# Patient Record
Sex: MALE | Race: BLACK OR AFRICAN AMERICAN | NOT HISPANIC OR LATINO | Employment: FULL TIME | URBAN - METROPOLITAN AREA
[De-identification: names, ages, dates, MRNs, and addresses within clinical notes are randomized per-mention and may not be internally consistent; named-entity substitution may affect disease eponyms.]

---

## 2018-04-02 ENCOUNTER — HOSPITAL ENCOUNTER (EMERGENCY)
Facility: MEDICAL CENTER | Age: 33
End: 2018-04-05
Attending: EMERGENCY MEDICINE

## 2018-04-02 DIAGNOSIS — F22 PARANOIA (PSYCHOSIS) (HCC): ICD-10-CM

## 2018-04-02 LAB
AMPHET UR QL SCN: NEGATIVE
BARBITURATES UR QL SCN: NEGATIVE
BENZODIAZ UR QL SCN: NEGATIVE
BZE UR QL SCN: POSITIVE
CANNABINOIDS UR QL SCN: NEGATIVE
METHADONE UR QL SCN: NEGATIVE
OPIATES UR QL SCN: NEGATIVE
OXYCODONE UR QL SCN: NEGATIVE
PCP UR QL SCN: NEGATIVE
POC BREATHALIZER: 0.06 PERCENT (ref 0–0.01)
PROPOXYPH UR QL SCN: NEGATIVE

## 2018-04-02 PROCEDURE — A9270 NON-COVERED ITEM OR SERVICE: HCPCS | Performed by: EMERGENCY MEDICINE

## 2018-04-02 PROCEDURE — 99285 EMERGENCY DEPT VISIT HI MDM: CPT

## 2018-04-02 PROCEDURE — 700102 HCHG RX REV CODE 250 W/ 637 OVERRIDE(OP): Performed by: EMERGENCY MEDICINE

## 2018-04-02 PROCEDURE — 80307 DRUG TEST PRSMV CHEM ANLYZR: CPT

## 2018-04-02 PROCEDURE — A9270 NON-COVERED ITEM OR SERVICE: HCPCS | Performed by: PSYCHIATRY & NEUROLOGY

## 2018-04-02 PROCEDURE — 90791 PSYCH DIAGNOSTIC EVALUATION: CPT

## 2018-04-02 PROCEDURE — 302970 POC BREATHALIZER: Performed by: EMERGENCY MEDICINE

## 2018-04-02 PROCEDURE — 700102 HCHG RX REV CODE 250 W/ 637 OVERRIDE(OP): Performed by: PSYCHIATRY & NEUROLOGY

## 2018-04-02 RX ORDER — RISPERIDONE 1 MG/1
1 TABLET ORAL 2 TIMES DAILY
Status: DISCONTINUED | OUTPATIENT
Start: 2018-04-02 | End: 2018-04-05 | Stop reason: HOSPADM

## 2018-04-02 RX ORDER — CITALOPRAM 20 MG/1
20 TABLET ORAL DAILY
COMMUNITY
End: 2018-04-02

## 2018-04-02 RX ORDER — ALPRAZOLAM 0.5 MG/1
0.5 TABLET ORAL
COMMUNITY
End: 2018-04-02

## 2018-04-02 RX ORDER — NICOTINE 21 MG/24HR
21 PATCH, TRANSDERMAL 24 HOURS TRANSDERMAL
Status: DISCONTINUED | OUTPATIENT
Start: 2018-04-02 | End: 2018-04-05 | Stop reason: HOSPADM

## 2018-04-02 RX ORDER — BUPROPION HYDROCHLORIDE 75 MG/1
75 TABLET ORAL 2 TIMES DAILY
COMMUNITY
End: 2018-04-02

## 2018-04-02 RX ADMIN — RISPERIDONE 1 MG: 1 TABLET, FILM COATED ORAL at 21:47

## 2018-04-02 RX ADMIN — NICOTINE TRANSDERMAL SYSTEM 21 MG: 21 PATCH, EXTENDED RELEASE TRANSDERMAL at 03:30

## 2018-04-02 RX ADMIN — RISPERIDONE 1 MG: 1 TABLET, FILM COATED ORAL at 11:40

## 2018-04-02 ASSESSMENT — PAIN SCALES - GENERAL
PAINLEVEL_OUTOF10: 0
PAINLEVEL_OUTOF10: 0

## 2018-04-02 ASSESSMENT — PAIN SCALES - WONG BAKER: WONGBAKER_NUMERICALRESPONSE: DOESN'T HURT AT ALL

## 2018-04-02 NOTE — PROGRESS NOTES
Patient's home medications have been reviewed by the pharmacy team.     No past medical history on file.    Patient's Medications   New Prescriptions    No medications on file   Previous Medications    No medications on file   Modified Medications    No medications on file   Discontinued Medications    ALPRAZOLAM (XANAX) 0.5 MG TAB    Take 0.5 mg by mouth 1 time daily as needed for Anxiety.    BUPROPION (WELLBUTRIN) 75 MG TAB    Take 75 mg by mouth 2 times a day.    CITALOPRAM (CELEXA) 20 MG TAB    Take 20 mg by mouth every day.          A:  Patient states he does not take any prescription or OTC medications.  Medications do not appear to be contributing to current complaints.       P:    No recommendations at this time. Will defer to psychiatry recommendations for current complaints.    Ariana Fields, PharmD., BCPS  PGY-2 Critical Care Resident  [phone]
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
## 2018-04-02 NOTE — ED TRIAGE NOTES
"Chief Complaint   Patient presents with   • Psych Eval     pt states his PCP prescribed him celexa, wellbutrin, and xanax. pt states he has not had anxiety and visual/auditory hallucinations. pt working in [place]- pt from [place]. pt went to hospital in [place]. They \"told me to sleep it off and gave me xanax\" pt is currently having relationship issues with girlfriend which is his trigger.  denies SI/HI. pt was dxw ith bipolar in 11/17       "

## 2018-04-02 NOTE — ED NOTES
PT IS RESTING IN BED NAD, PT IS CALM, RN WAS GIVEN REPORT. PT IS A SAD OF 6. RN WAS TOLD ABOUT PTS BELONGS.

## 2018-04-02 NOTE — DISCHARGE PLANNING
Medical Social Work    Referral: Legal Hold    Intervention: Legal Hold Paperwork given to SW by [employer]: Alka    Legal Hold Initiated: Date: 04/02/2018  Time: 0153    Legal Hold faxed: Date: 04/02/2018  Time: 0515    Patient’s Insurance Listed on Face Sheet: None    Referrals sent to: [facility]    Plan: Patient will transfer to mental health facility once acceptance is obtained.
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
## 2018-04-03 PROCEDURE — A9270 NON-COVERED ITEM OR SERVICE: HCPCS | Performed by: EMERGENCY MEDICINE

## 2018-04-03 PROCEDURE — 700102 HCHG RX REV CODE 250 W/ 637 OVERRIDE(OP): Performed by: EMERGENCY MEDICINE

## 2018-04-03 PROCEDURE — 700102 HCHG RX REV CODE 250 W/ 637 OVERRIDE(OP): Performed by: PSYCHIATRY & NEUROLOGY

## 2018-04-03 PROCEDURE — A9270 NON-COVERED ITEM OR SERVICE: HCPCS | Performed by: PSYCHIATRY & NEUROLOGY

## 2018-04-03 RX ORDER — ALPRAZOLAM 0.25 MG/1
0.5 TABLET ORAL ONCE
Status: COMPLETED | OUTPATIENT
Start: 2018-04-03 | End: 2018-04-03

## 2018-04-03 RX ADMIN — RISPERIDONE 1 MG: 1 TABLET, FILM COATED ORAL at 09:04

## 2018-04-03 RX ADMIN — RISPERIDONE 1 MG: 1 TABLET, FILM COATED ORAL at 21:08

## 2018-04-03 RX ADMIN — ALPRAZOLAM 0.5 MG: 0.25 TABLET ORAL at 10:46

## 2018-04-03 RX ADMIN — NICOTINE TRANSDERMAL SYSTEM 21 MG: 21 PATCH, EXTENDED RELEASE TRANSDERMAL at 06:29

## 2018-04-03 ASSESSMENT — PAIN SCALES - WONG BAKER: WONGBAKER_NUMERICALRESPONSE: DOESN'T HURT AT ALL

## 2018-04-03 ASSESSMENT — PAIN SCALES - GENERAL: PAINLEVEL_OUTOF10: 0

## 2018-04-03 NOTE — ED NOTES
"Pt stated he is upset he can't listen to the tv, he is \"sick of reading the subtitles, all I can hear are my thoughts and y'alls voices, you can understand why I'm aggravated, I'm not trying to be mean but this has been going on since Sunday\"    Apologized to pt and explained 1 time order of xanax ordered for pt. Pt cooperative.  "

## 2018-04-03 NOTE — ED PROVIDER NOTES
ED Provider Note    Addendum:  Patient is waiting for psychiatric transfer. He has been resting pretty comfortably in the emergency room. We did give him one Xanax while here. He has been seen by psychiatry.

## 2018-04-03 NOTE — ED NOTES
"Pt's girlfriend called for update, updated pt's girlfriend. Pt aware girlfriend called.     Pt slightly irritated he can't smoke, says \" no one told me I couldn't smoke\" explained hospital is non-smoking and pt has nicotine patch. Pt cooperative and agreeable.    Cont 1:1 mx  "

## 2018-04-03 NOTE — ED PROVIDER NOTES
ED Provider Note    4/2 this is a patient awaiting transfer to psychiatric facility. Currently has no complaints.    Age and has been pleasant, cooperative. Vital signs have remained normal.    April 4. This is a patient awaiting transfer to psychiatric facility. Currently pleasant and cooperative, no complaints. Vital signs have remained normal

## 2018-04-03 NOTE — ED NOTES
Pt refused breakfast, only wanted OJ with his meds.    Pt Aox4. Denies VH/AH since he's been in the Er. States he feels safe. Pt aware he is waiting to be transferred to psych facility.     Pt went back to sleep. Cont 1:1 mx at bedside

## 2018-04-03 NOTE — DISCHARGE PLANNING
Extension petition filed with the Court via eFlex, waiting on verified petition. Pt awaiting transfer to psychiatric facility.

## 2018-04-03 NOTE — ED NOTES
Pt ambulated to  with steady gait.    Sitter accompanied pt.    Pt req toothbrush and toothpaste, items provided for oral care and removed after use

## 2018-04-03 NOTE — DISCHARGE PLANNING
Alert Team Rounds: Patient observed resting, continues to await psychiatric hospitalization.    Alka La, Ph.D.  Alert Team Therapist

## 2018-04-04 PROCEDURE — 700102 HCHG RX REV CODE 250 W/ 637 OVERRIDE(OP): Performed by: EMERGENCY MEDICINE

## 2018-04-04 PROCEDURE — 700102 HCHG RX REV CODE 250 W/ 637 OVERRIDE(OP): Performed by: PSYCHIATRY & NEUROLOGY

## 2018-04-04 PROCEDURE — A9270 NON-COVERED ITEM OR SERVICE: HCPCS | Performed by: EMERGENCY MEDICINE

## 2018-04-04 PROCEDURE — A9270 NON-COVERED ITEM OR SERVICE: HCPCS | Performed by: PSYCHIATRY & NEUROLOGY

## 2018-04-04 RX ORDER — LORAZEPAM 1 MG/1
1 TABLET ORAL ONCE
Status: COMPLETED | OUTPATIENT
Start: 2018-04-04 | End: 2018-04-04

## 2018-04-04 RX ADMIN — LORAZEPAM 1 MG: 1 TABLET ORAL at 00:45

## 2018-04-04 RX ADMIN — RISPERIDONE 1 MG: 1 TABLET, FILM COATED ORAL at 08:24

## 2018-04-04 RX ADMIN — NICOTINE TRANSDERMAL SYSTEM 21 MG: 21 PATCH, EXTENDED RELEASE TRANSDERMAL at 06:00

## 2018-04-04 RX ADMIN — RISPERIDONE 1 MG: 1 TABLET, FILM COATED ORAL at 20:52

## 2018-04-04 ASSESSMENT — LIFESTYLE VARIABLES: DO YOU DRINK ALCOHOL: NO

## 2018-04-04 ASSESSMENT — PAIN SCALES - GENERAL: PAINLEVEL_OUTOF10: 0

## 2018-04-04 NOTE — ED NOTES
Pt sleeping w/ visible rise and fall of chest; even, unlabored respirations observed. Sitter in direct view. Will continue to monitor.

## 2018-04-04 NOTE — DISCHARGE PLANNING
Medical Social Work     Referral: Patient was presented for a telehealth consultation via secure and encrypted videoconferencing technology.       Intervention: Pt presented for legal hold evaluation with court physicians.  advised SW that pt's legal hold will be released. Pt will need to remain in the hospital under legal hold precautions until court paperwork is received, which usually arrives Thursday afternoon.      Plan: Waiting on court document releasing legal hold.

## 2018-04-04 NOTE — ED NOTES
Pt's girlfriend rosendo called for update, gave phone to pt to talk to rosendo.    Cont 1:1 mx for safety

## 2018-04-04 NOTE — ED NOTES
"Nurse introduced self to pt. Nurse notified pt that mother, Raymond Wynn ([phone]) called and asking for update on pt. Pt said it is ok to talk to mother about his care. Pt agitated that tv is not working well, asked to move room also because he \"can't get any sleep.\" Nurse notified pt that rooms cannot be changed at this time because ER is filled. Pt said, \"yes you can, I know there is another room.\" Nurse explained that there was currently pt's sitting in the hallways, there was not extra rooms. Pt expressed understanding.Pt in view of nursing station, sitter outside room and in direct view of pt.   "

## 2018-04-04 NOTE — ED NOTES
"Pt said he was unsure about plan of care and that he \"felt like I'm in the dark about what to expect.\" Nurse updated pt on plan of care, what to be expected. Pt expressed understanding, expressed gratitude. Notified he would be changing rooms as soon as other room is clean. April, RN given report, will call when [address] is clean.  "

## 2018-04-04 NOTE — DISCHARGE PLANNING
SW received verified extension petition, copy provided to ER SW. Pt awaiting transfer to psychiatric facility.

## 2018-04-04 NOTE — ED NOTES
Pt asked to speak with mother. Talking with mother calmly on phone now, sitter continuing to monitor pt.

## 2018-04-04 NOTE — ED NOTES
Pt given box of food and water; VSS at this time; pt in direct view of nurse's station and denying further needs. Will continue to monitor.

## 2018-04-04 NOTE — DISCHARGE PLANNING
Medical Social Work    Referral: Legal Hold Court     Intervention: Pt presented for legal hold meeting with .  advised pt will meet with court MD's via telemedicine monitor to contest the legal hold.      Plan: Pt will present to telemedicine mental health to meet with court physicians. SW will call bedside RN once time has been determined.

## 2018-04-05 ENCOUNTER — HOSPITAL ENCOUNTER (EMERGENCY)
Facility: MEDICAL CENTER | Age: 33
End: 2018-04-05
Attending: EMERGENCY MEDICINE

## 2018-04-05 VITALS
HEIGHT: 75 IN | BODY MASS INDEX: 29.6 KG/M2 | DIASTOLIC BLOOD PRESSURE: 67 MMHG | RESPIRATION RATE: 14 BRPM | OXYGEN SATURATION: 98 % | WEIGHT: 238.1 LBS | HEART RATE: 102 BPM | SYSTOLIC BLOOD PRESSURE: 122 MMHG | TEMPERATURE: 97 F

## 2018-04-05 VITALS
SYSTOLIC BLOOD PRESSURE: 137 MMHG | DIASTOLIC BLOOD PRESSURE: 100 MMHG | BODY MASS INDEX: 29.52 KG/M2 | HEART RATE: 74 BPM | HEIGHT: 74 IN | RESPIRATION RATE: 14 BRPM | WEIGHT: 230 LBS | OXYGEN SATURATION: 100 % | TEMPERATURE: 98.6 F

## 2018-04-05 DIAGNOSIS — F31.9 BIPOLAR DISEASE, CHRONIC (HCC): ICD-10-CM

## 2018-04-05 DIAGNOSIS — Z86.59 HISTORY OF PSYCHIATRIC DISORDER: ICD-10-CM

## 2018-04-05 PROCEDURE — 700102 HCHG RX REV CODE 250 W/ 637 OVERRIDE(OP): Performed by: PSYCHIATRY & NEUROLOGY

## 2018-04-05 PROCEDURE — 700102 HCHG RX REV CODE 250 W/ 637 OVERRIDE(OP): Performed by: EMERGENCY MEDICINE

## 2018-04-05 PROCEDURE — A9270 NON-COVERED ITEM OR SERVICE: HCPCS | Performed by: PSYCHIATRY & NEUROLOGY

## 2018-04-05 PROCEDURE — A9270 NON-COVERED ITEM OR SERVICE: HCPCS | Performed by: EMERGENCY MEDICINE

## 2018-04-05 PROCEDURE — 99284 EMERGENCY DEPT VISIT MOD MDM: CPT

## 2018-04-05 PROCEDURE — 90791 PSYCH DIAGNOSTIC EVALUATION: CPT

## 2018-04-05 RX ORDER — HYDROXYZINE PAMOATE 25 MG/1
25 CAPSULE ORAL ONCE
Status: COMPLETED | OUTPATIENT
Start: 2018-04-05 | End: 2018-04-05

## 2018-04-05 RX ORDER — RISPERIDONE 1 MG/1
1 TABLET ORAL 2 TIMES DAILY
Status: DISCONTINUED | OUTPATIENT
Start: 2018-04-05 | End: 2018-04-06 | Stop reason: HOSPADM

## 2018-04-05 RX ORDER — LORAZEPAM 1 MG/1
1 TABLET ORAL ONCE
Status: COMPLETED | OUTPATIENT
Start: 2018-04-05 | End: 2018-04-05

## 2018-04-05 RX ORDER — RISPERIDONE 1 MG/1
1 TABLET ORAL 2 TIMES DAILY
Qty: 6 TAB | Refills: 0 | Status: SHIPPED | OUTPATIENT
Start: 2018-04-05 | End: 2018-04-08

## 2018-04-05 RX ORDER — RISPERIDONE 1 MG/1
1 TABLET ORAL 2 TIMES DAILY
Qty: 60 TAB | Refills: 3 | Status: SHIPPED | OUTPATIENT
Start: 2018-04-05 | End: 2018-04-05

## 2018-04-05 RX ADMIN — LORAZEPAM 1 MG: 1 TABLET ORAL at 01:57

## 2018-04-05 RX ADMIN — RISPERIDONE 1 MG: 1 TABLET, FILM COATED ORAL at 09:17

## 2018-04-05 RX ADMIN — RISPERIDONE 1 MG: 1 TABLET, FILM COATED ORAL at 22:12

## 2018-04-05 RX ADMIN — NICOTINE TRANSDERMAL SYSTEM 21 MG: 21 PATCH, EXTENDED RELEASE TRANSDERMAL at 06:43

## 2018-04-05 RX ADMIN — HYDROXYZINE PAMOATE 25 MG: 25 CAPSULE ORAL at 22:15

## 2018-04-05 NOTE — ED NOTES
Pt resting quietly in room at this time.  Lights turned down.  Pt cooperative with care, appears asleep at this time.  Pt in full view of nurses station, sitter outside pt room at this time.

## 2018-04-05 NOTE — ED PROVIDER NOTES
ED Provider Note    Patient continues to a transfer to an inpatient psychiatric facility. Patient has been cooperative with nursing staff eating and drinking okay. No further complaints at this time

## 2018-04-05 NOTE — ED NOTES
Pt resting quietly in room, lights turned down.  Pt appears sleeping.  Sitter outside room, pt in full view of nurses station.  Pt cooperative with care.

## 2018-04-05 NOTE — ED NOTES
Pt up to restroom at this time, asking about getting his phone since he was told he will be released tomorrow.  Pt informed he will receive his phone with other belongings on discharge.

## 2018-04-05 NOTE — ED NOTES
Break RN: Patient given discharge instructions and follow up information, verbalized understanding, denies SI/HI, prescription x 1 electronically sent to pharmacy, location verified with patient, patient ambulatory to lobby with steady gait, awaiting belongings from security.

## 2018-04-05 NOTE — DISCHARGE PLANNING
[facility]  Crisis/Safety Plan    Name:  Rob Wynn  MRN:  [id number]  Date:  2018    Warning signs that a crisis may be developing for me or I may be at risk:  1) drug use  2) alcohol use  3)paranoia    Coping strategies I can use on my own (relaxation, physical activity, etc):  1) breathing exercises  2) meditation  3) talking with someone    Ways I can make my environment safe:  1) surrounding myself with positive people  2) keeping my triggers away  3)making sure the people around me only have positive intentions    Things I want to tell myself when I feel a crisis developin) drugs are not the answer  2) Have I taken my meds  3) I need to shrdadha a counselor    People I can contact for support or distraction (and their phone numbers):  1) Raymond Wynn   2) Garcia Crawford  3) Lashawn Robb    If I’m not able to reach my support people, or the above strategies don’t help, I can contact the following professionals, agencies, or hotlines:  1) Crisis Call Center ():  1-607.764.4168 -OR- (956) [phone]  2) Crisis Text Line ():  Text START to 835595  3)   4)     Sandra Sanabria R.N.

## 2018-04-05 NOTE — ED NOTES
Pt resting quietly in room.  Appears asleep.  Pt cooperative with care.  Sitter at bedside. Pt in full view of nurses station.

## 2018-04-05 NOTE — ED NOTES
All personal belongings returned to pt. Pt has belongings in security, par, and pharmacy. Waiting for all belonging to be returned.

## 2018-04-05 NOTE — ED NOTES
Pt resting quietly in room, lights turned down.  Pt medicated per MAR.  Pt was up requesting something to help him sleep.  Pt received ativan 1 mg last night, ordered again tonight.  Pt cooperative with care.

## 2018-04-05 NOTE — DISCHARGE INSTRUCTIONS
Hallucinations and Delusions  You seem to be having hallucinations and/or delusions. You may be hearing voices that no one else can hear. This can seem very real to you. You may be having thoughts and fears that do not make sense to others. This condition can be due to mental disease like schizophrenia. It may be caused by a medical condition, such as an infection or electrolyte disturbance. These symptoms are also seen in drug abusers, especially those who use crack cocaine and amphetamines. Drugs like PCP, LSD, MDMA, peyote, and psilocybin can also cause frightening hallucinations and loss of control.  If your symptoms are due to drug abuse, your mental state should improve as the drug(s) leave your system. Someone you trust should be with you until you are better to protect you and calm your fears. Often tranquilizers are very helpful at controlling hallucinations, anxiety, and destructive behavior. Getting a proper diet and enough sleep is important to recovery. If your symptoms are not due to drugs, or do not improve over several days after stopping drug use, you need further medical or mental health care.  SEEK IMMEDIATE MEDICAL CARE IF:   · Your symptoms get worse, especially if you think your life is in danger  · You have violent or destructive thoughts.  Recovery is possible, but you have to get proper treatment and avoid drugs that are known to cause you trouble.  Document Released: 01/25/2006 Document Revised: 03/11/2013 Document Reviewed: 12/18/2006  [employer]® Patient Information ©2014 [employer].

## 2018-04-05 NOTE — ED PROVIDER NOTES
ED Provider Note Addendum    Scribed for Minh Hawkins M.D. by Kai Ramirez on 2018 at 9:16 AM.     This is an addendum to the note on Rob Wynn.  For further details and full chart information, see patient's initial note.       6:16 AM - I discussed the patient's case with Dr. Crowell ([facility]) who will transfer care of the patient to me at this time.        12:40 PM  - Patient evaluated by myself. He states that he is feeling greatly improved from when he first arrived. Patient has been evaluated by psychiatry and states that he is looking forward to going home today. He is alert, awake, seems appropriate and is resting comfortably at this time with no complaints.    12:57 PM - Consulted with psychiatrist who agrees that the patient is recommended for discharge. Family has been contacted in anticipation for discharge. Nurse informs that the patient's legal 2000  and the court that initiated the hold did not want to renew the document secondary to his improvement. Psychiatrist is preparing him for discharge at this time.     Medical Decision Making    Patient was legal hold, was seen by the psychiatrist, the legal hold was discontinued. The patient is awake and alert, will be discharged home, have the patient return with any other concerns.    The patient will return for new or worsening symptoms and is stable at the time of discharge.    The patient is referred to a primary physician for blood pressure management, diabetic screening, and for all other preventative health concerns.      DISPOSITION:  Patient will be discharged home in stable condition.    FOLLOW UP:  No follow-up provider specified.    OUTPATIENT MEDICATIONS:  New Prescriptions    No medications on file     FINAL IMPRESSION  1. Paranoia (psychosis) (CMS-[facility])         IKai ([employer]), am scribing for, and in the presence of, Minh Hawkins M.D..    Electronically signed by: Kai Ramirez ([employer]),  4/5/2018    IMinh M.D. personally performed the services described in this documentation, as scribed by Kai Ramirez in my presence, and it is both accurate and complete.    The note accurately reflects work and decisions made by me.  Minh Hawkins  4/5/2018  3:08 PM

## 2018-04-05 NOTE — ED NOTES
Pt resting quietly in room.  Lights turned down.  Sitter outside room.  Pt in full view of nurses station.  Pt appears asleep, cooperating with care.

## 2018-04-05 NOTE — PSYCHIATRY
"PSYCHIATRIC FOLLOW UP:    Reason for Admission: psychosis   Legal hold status:   On hold   Psychiatric Supervising Attending:     Mariana      HPI:     33 yo man with hx of reported bipolar. He presented with visual and auditory hallucinations. Paranoia resolved. He is no longer concerned about people outside his out, people following him, etc. His mood is good, thoughts no longer racing.       Psychiatric Examination: observed phenomenon:  Vitals:  Vitals:    04/04/18 1030 04/04/18 1727 04/04/18 2300 04/05/18 0643   BP: 125/77 129/91 129/82 128/94   Pulse: 80 71 67 71   Resp: 16 16 16 16   Temp: 37.1 °C (98.8 °F)  36.8 °C (98.2 °F) 37 °C (98.6 °F)   SpO2: 98% 97% 98%    Weight:       Height:           Musculoskeletal no psychomotor agitation or retardation   Appearance:Grooming wnl   Thoughts:Logical and sequential, goal-directed No a/vh, no evidence of delusions, no ideas of reference, no internal stimulation noted   Speech:Nl tone, rate, and volume. Not pressured. Understandable.   Mood:    \"good\"  Affect:    Full and congruent   SI/HI:   Denies   Attention/Alertness:   Awake, alert  Memory:    Grooming wnl   Orientation:    Grooming wnl   Fund of Knowledge:    Grossly intact.   Cognition:Grossly intact.   Insight/Judgement into symptoms Grossly intact.   Neurological Testing:    Medical systems reviewed:   Denies pain, denies sob, denies n/v  All other systems reviewed and are negative.         Lab results/tests:   Recent Results (from the past 76 hour(s))   URINE DRUG SCREEN    Collection Time: 04/06/18  3:11 AM   Result Value Ref Range    Amphetamines Urine Negative Negative    Barbiturates Negative Negative    Benzodiazepines Negative Negative    Cocaine Metabolite Positive (A) Negative    Methadone Negative Negative    Opiates Negative Negative    Oxycodone Negative Negative    Phencyclidine -Pcp Negative Negative    Propoxyphene Negative Negative    Cannabinoid Metab Negative Negative        "       Assessment:  Bipolar disorder, current episode manic, resolved   Cocaine use disorder           Plan:  legal hold: discontinue   D/c home  Giving prescription for risperdal     Signing off

## 2018-04-05 NOTE — DISCHARGE PLANNING
SW received court order releasing pt's hold. Copy sent to medical records as pt's hold was released earlier today by Dr. Diaz.

## 2018-04-05 NOTE — DISCHARGE PLANNING
Alert team note:  Patient is now clear.  Patient was assessed by the court yesterday.  Court has discontinued the legal hold.  Resources for follow up provided; [facility], [facility], [facility] and [facility] and the AA meeting schedule. Crisis safety plan was completed and a copy was provided to the patient.

## 2018-04-05 NOTE — ED NOTES
Assumed care after report, sitter in direct observation observing pt. Pt sleeping, visible chest rise observed. No acute distress noted.

## 2018-04-05 NOTE — ED NOTES
Pt resting quietly in room, lights turned down.  Pt appears asleep.  Pt cooperating with care.  Sitter outside room, pt in full view nurses station.

## 2018-04-05 NOTE — ED NOTES
Pt resting quietly in room at this time, lights turned down.  Sitter outside room.  Pt in full view of nurses station.  Pt appears sleeping.  Pt cooperative with care at this time.

## 2018-04-05 NOTE — ED NOTES
Pt resting quietly in room.  Pt requesting food, sitter to go get food for pt.  Pt calm and cooperative at this time.  Pt in full view of nurses station.

## 2018-04-06 ENCOUNTER — HOSPITAL ENCOUNTER (EMERGENCY)
Facility: MEDICAL CENTER | Age: 33
End: 2018-04-06
Attending: EMERGENCY MEDICINE

## 2018-04-06 VITALS
BODY MASS INDEX: 29.59 KG/M2 | SYSTOLIC BLOOD PRESSURE: 124 MMHG | DIASTOLIC BLOOD PRESSURE: 84 MMHG | HEIGHT: 75 IN | TEMPERATURE: 97.9 F | OXYGEN SATURATION: 96 % | HEART RATE: 78 BPM | WEIGHT: 238 LBS | RESPIRATION RATE: 18 BRPM

## 2018-04-06 DIAGNOSIS — F31.9 BIPOLAR AFFECTIVE DISORDER, REMISSION STATUS UNSPECIFIED (HCC): ICD-10-CM

## 2018-04-06 LAB
AMPHET UR QL SCN: NEGATIVE
BARBITURATES UR QL SCN: NEGATIVE
BENZODIAZ UR QL SCN: NEGATIVE
BZE UR QL SCN: POSITIVE
CANNABINOIDS UR QL SCN: NEGATIVE
METHADONE UR QL SCN: NEGATIVE
OPIATES UR QL SCN: NEGATIVE
OXYCODONE UR QL SCN: NEGATIVE
PCP UR QL SCN: NEGATIVE
PROPOXYPH UR QL SCN: NEGATIVE

## 2018-04-06 PROCEDURE — 700102 HCHG RX REV CODE 250 W/ 637 OVERRIDE(OP): Performed by: EMERGENCY MEDICINE

## 2018-04-06 PROCEDURE — 90791 PSYCH DIAGNOSTIC EVALUATION: CPT

## 2018-04-06 PROCEDURE — A9270 NON-COVERED ITEM OR SERVICE: HCPCS | Performed by: EMERGENCY MEDICINE

## 2018-04-06 PROCEDURE — 80307 DRUG TEST PRSMV CHEM ANLYZR: CPT

## 2018-04-06 PROCEDURE — 99284 EMERGENCY DEPT VISIT MOD MDM: CPT

## 2018-04-06 RX ORDER — LORAZEPAM 1 MG/1
1 TABLET ORAL ONCE
Status: COMPLETED | OUTPATIENT
Start: 2018-04-06 | End: 2018-04-06

## 2018-04-06 RX ADMIN — LORAZEPAM 1 MG: 1 TABLET ORAL at 03:07

## 2018-04-06 NOTE — ED NOTES
Patient to follow up with his doctor who manages his meds.  Patient verbalizes understanding.  Patient stable.  All questions answered
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
## 2018-04-06 NOTE — ED NOTES
Pt to be discharged following medication administration. Pt to be provided w/ discharge instructions, education for follow-up, all questions to be answered.

## 2018-04-06 NOTE — ED PROVIDER NOTES
"ED Provider Note    CHIEF COMPLAINT  Chief Complaint   Patient presents with   • Psych Eval     \"I was just released from here, but I got back to my hotel and my paranoia went through the roof.\" Denies hallucinations at this time. HX of bipolar disorder. Denies ETOH and substance abuse. Denies SI/HI       HPI  Rob Wynn is a 32 y.o. male who presents with history of bipolar disorder sure for medication refill. Patient reports he was unable to fill his medication because it cost too much. He is requesting a smaller amount of medication so he can afford it until his girlfriend arrives and can help him.     He denies any associated auditory or visual hallucinations. He denies any suicidal or homicidal ideations. Patient was supposed to stay. Patient reports difficulty sleeping.    REVIEW OF SYSTEMS  ROS  See HPI for further details. All other systems are negative.     PAST MEDICAL HISTORY   has a past medical history of Bipolar disorder (CMS-[facility]).    SOCIAL HISTORY  Social History     Social History Main Topics   • Smoking status: Current Every Day Smoker     Packs/day: 0.50     Types: Cigarettes   • Smokeless tobacco: Never Used   • Alcohol use No   • Drug use: No   • Sexual activity: Not on file       SURGICAL HISTORY  patient denies any surgical history    CURRENT MEDICATIONS  Home Medications     Reviewed by Edinson Farfan R.N. (Registered Nurse) on 04/05/18 at 2156  Med List Status: Complete   Medication Last Dose Status   risperiDONE (RISPERDAL) 1 MG Tab  Active                ALLERGIES  Allergies   Allergen Reactions   • Aspirin Swelling     \"lips and tongue got swollen\"        PHYSICAL EXAM  Physical Exam   Constitutional: He is oriented to person, place, and time. He appears well-developed and well-nourished.   Pulmonary/Chest: Effort normal.   Neurological: He is alert and oriented to person, place, and time.   Skin: Skin is warm and dry.   Psychiatric: He has a normal mood and affect. His behavior is " normal. Judgment and thought content normal.   No SI no HI and no AH and VH         COURSE & MEDICAL DECISION MAKING  Pertinent Labs & Imaging studies reviewed. (See chart for details)  Will treat patient here with symptoms of insomnia in the setting of being out of his medication for one day. Patient denies any current psychotic symptoms. Will facilitate patient's refill. Patient contracts for safety.  Patient be discharged home with refill to get him to Sunday when his girlfriend arrives.      FINAL IMPRESSION  1. Medication refill, bipolar disorder         Electronically signed by: Luis King, 4/5/2018 10:01 PM

## 2018-04-06 NOTE — ED TRIAGE NOTES
"Rob Wynn  32 y.o. male  Chief Complaint   Patient presents with   • Psych Eval     \"It's getting worse, I've tried meditatation, praying, medication, but my paranoia is through the roof. I'd like to go with the plan B the doctor told me about yesterday which was to be admitted.\"        Pt amb to triage with steady gait for above complaint. Pt was seen yesterday evening for same CC, but is in obvious distress that his paranoia has not subsided. He remains cooperative and continues to apologize for having to check back into the ED. Denies SI/HI at this time.   Pt is alert and oriented, speaking in full sentences, follows commands and responds appropriately to questions.    Pt placed in lobby. Pt educated on triage process. Pt encouraged to alert staff for any changes.    "

## 2018-04-06 NOTE — ED PROVIDER NOTES
"ED Provider Note    CHIEF COMPLAINT  Chief Complaint   Patient presents with   • Psych Eval     \"It's getting worse, I've tried meditatation, praying, medication, but my paranoia is through the roof. I'd like to go with the plan B the doctor told me about yesterday which was to be admitted.\"        HPI  Rob Wynn is a 32 y.o. male who presents with ongoing insomnia and mild paranoia. He does not have any current fixed paranoid delusions. Patient denies any auditory or visual hallucinations. Patient continues to deny any suicidal or homicidal ideations. He reports return back to the emergency department because when he got back home he was unable to sleep despite meditation and prayer. Patient denies any chest pain shortness of breath fever or constitutional symptoms otherwise    REVIEW OF SYSTEMS  Review of Systems   All other systems reviewed and are negative.    See HPI for further details. All other systems are negative.     PAST MEDICAL HISTORY   has a past medical history of Bipolar disorder (CMS-[facility]).    SOCIAL HISTORY  Social History     Social History Main Topics   • Smoking status: Current Every Day Smoker     Packs/day: 0.50     Types: Cigarettes   • Smokeless tobacco: Never Used   • Alcohol use No   • Drug use: No   • Sexual activity: Not on file       SURGICAL HISTORY  patient denies any surgical history    CURRENT MEDICATIONS  Home Medications    **Home medications have not yet been reviewed for this encounter**         ALLERGIES  Allergies   Allergen Reactions   • Aspirin Swelling     \"lips and tongue got swollen\"        PHYSICAL EXAM  Physical Exam   Constitutional: He is oriented to person, place, and time. He appears well-developed and well-nourished.   HENT:   Head: Normocephalic and atraumatic.   Eyes: Conjunctivae are normal. Pupils are equal, round, and reactive to light.   Neck: Normal range of motion. Neck supple.   Cardiovascular: Normal rate and regular rhythm.    Pulmonary/Chest: " Effort normal and breath sounds normal.   Abdominal: Soft. Bowel sounds are normal. He exhibits no distension. There is no tenderness. There is no rebound and no guarding.   Musculoskeletal: Normal range of motion.   Neurological: He is alert and oriented to person, place, and time.   Skin: Skin is warm. No rash noted.       COURSE & MEDICAL DECISION MAKING  Pertinent Labs & Imaging studies reviewed. (See chart for details)  Patient here with ongoing insomnia. He has no auditory or visual hallucinations he remains with a safe place to stay. He does not have any psychotic delusions or signs of psychosis on exam. Patient is mildly anxious but otherwise I believe is not a harm to himself or others. Patient without any medical complaints such as chest pain or shortness of breath.  I will give Ativan for patient's symptoms. I do not believe patient warrants inpatient admission but will have wife skills see patient tomorrow morning to facilitate outpatient management.      FINAL IMPRESSION  1. Insomnia, bipolar disorder         Electronically signed by: Luis King, 4/6/2018 2:54 AM

## 2018-04-06 NOTE — DISCHARGE INSTRUCTIONS
Bipolar 1 Disorder  Bipolar 1 disorder is a mental health disorder in which a person has episodes of emotional highs (sudha), and may also have episodes of emotional lows (depression) in addition to highs. Bipolar 1 disorder is different from other bipolar disorders because it involves extreme manic episodes. These episodes last at least one week or involve symptoms that are so severe that hospitalization is needed to keep the person safe.  What increases the risk?  The cause of this condition is not known. However, certain factors make you more likely to have bipolar disorder, such as:  · Having a family member with the disorder.  · An imbalance of certain chemicals in the brain (neurotransmitters).  · Stress, such as illness, financial problems, or a death.  · Certain conditions that affect the brain or spinal cord (neurologic conditions).  · Brain injury (trauma).  · Having another mental health disorder, such as:  ¨ Obsessive compulsive disorder.  ¨ Schizophrenia.  What are the signs or symptoms?  Symptoms of sudha include:  · Very high self-esteem or self-confidence.  · Decreased need for sleep.  · Unusual talkativeness or feeling a need to keep talking. Speech may be very fast. It may seem like you cannot stop talking.  · Racing thoughts or constant talking, with quick shifts between topics that may or may not be related (flight of ideas).  · Decreased ability to focus or concentrate.  · Increased purposeful activity, such as work, studies, or social activity.  · Increased nonproductive activity. This could be pacing, squirming and fidgeting, or finger and toe tapping.  · Impulsive behavior and poor judgment. This may result in high-risk activities, such as having unprotected sex or spending a lot of money.  Symptoms of depression include:  · Feeling sad, hopeless, or helpless.  · Frequent or uncontrollable crying.  · Lack of feeling or caring about anything.  · Sleeping too much.  · Moving more slowly than  usual.  · Not being able to enjoy things you used to enjoy.  · Wanting to be alone all the time.  · Feeling guilty or worthless.  · Lack of energy or motivation.  · Trouble concentrating or remembering.  · Trouble making decisions.  · Increased appetite.  · Thoughts of death, or the desire to harm yourself.  Sometimes, you may have a mixed mood. This means having symptoms of depression and sudha. Stress can make symptoms worse.  How is this diagnosed?  To diagnose bipolar disorder, your health care provider may ask about your:  · Emotional episodes.  · Medical history.  · Alcohol and drug use. This includes prescription medicines. Certain medical conditions and substances can cause symptoms that seem like bipolar disorder (secondary bipolar disorder).  How is this treated?  Bipolar disorder is a long-term (chronic) illness. It is best controlled with ongoing (continuous) treatment rather than treatment only when symptoms occur. Treatment may include:  · Medicine. Medicine can be prescribed by a provider who specializes in treating mental disorders (psychiatrist).  ¨ Medicines called mood stabilizers are usually prescribed.  ¨ If symptoms occur even while taking a mood stabilizer, other medicines may be added.  · Psychotherapy. Some forms of talk therapy, such as cognitive-behavioral therapy (CBT), can provide support, education, and guidance.  · Coping methods, such as journaling or relaxation exercises. These may include:  ¨ Yoga.  ¨ Meditation.  ¨ Deep breathing.  · Lifestyle changes, such as:  ¨ Limiting alcohol and drug use.  ¨ Exercising regularly.  ¨ Getting plenty of sleep.  ¨ Making healthy eating choices.  A combination of medicine, talk therapy, and coping methods is best. A procedure in which electricity is applied to the brain through the scalp (electroconvulsive therapy) may be used in cases of severe sudha when medicine and psychotherapy work too slowly or do not work.  Follow these instructions at  home:  Activity  · Return to your normal activities as told by your health care provider.  · Find activities that you enjoy, and make time to do them.  · Exercise regularly as told by your health care provider.  Lifestyle  · Limit alcohol intake to no more than 1 drink a day for nonpregnant women and 2 drinks a day for men. One drink equals 12 oz of beer, 5 oz of wine, or 1½ oz of hard liquor.  · Follow a set schedule for eating and sleeping.  · Eat a balanced diet that includes fresh fruits and vegetables, whole grains, low-fat dairy, and lean meat.  · Get 7-8 hours of sleep each night.  General instructions  · Take over-the-counter and prescription medicines only as told by your health care provider.  · Think about joining a support group. Your health care provider may be able to recommend a support group.  · Talk with your family and loved ones about your treatment goals and how they can help.  · Keep all follow-up visits as told by your health care provider. This is important.  Where to find more information:  For more information about bipolar disorder, visit the following websites:  · National [place] on Mental Illness: www.heather.org  · U[place] National [place] of Mental Health: www.nimh.nih.gov  Contact a health care provider if:  · Your symptoms get worse.  · You have side effects from your medicine, and they get worse.  · You have trouble sleeping.  · You have trouble doing daily activities.  · You feel unsafe in your surroundings.  · You are dealing with substance abuse.  Get help right away if:  · You have new symptoms.  · You have thoughts about harming yourself.  · You self-harm.  This information is not intended to replace advice given to you by your health care provider. Make sure you discuss any questions you have with your health care provider.  Document Released: 03/26/2002 Document Revised: 08/13/2017 Document Reviewed: 08/17/2017  Elsevier Interactive Patient Education © 2017 Elsevier Inc.

## 2018-04-06 NOTE — DISCHARGE PLANNING
[facility]  Crisis/Safety Plan    Name:  Rob Wynn  MRN:  [id number]  Date:  2018    Warning signs that a crisis may be developing for me or I may be at risk:  1) Paranoia   2) Fearfullness  3)    Coping strategies I can use on my own (relaxation, physical activity, etc):  1) Take medication (didn't currently have any)  2) Breathing  3)     Ways I can make my environment safe:  1)By taking medication  2)seeing a psychiatrist  3)    Things I want to tell myself when I feel a crisis developin) I can get help  2)   3)    People I can contact for support or distraction (and their phone numbers):  1) Already given today when discharged  2)   3)    If I’m not able to reach my support people, or the above strategies don’t help, I can contact the following professionals, agencies, or hotlines:  1) Crisis Call Center ():  [phone] -OR- [phone]  2) Crisis Text Line ():  Text START to 579487  3)   4)     Sailaja Thomas R.N.
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
## 2018-04-06 NOTE — ED NOTES
Pt has increased paranoia since discharge.  Denies si/hi a/v hallucinations.  States medication prescribed was too expensive.  Possible admit to psych facility for help, brought suitcase

## 2018-04-06 NOTE — ED TRIAGE NOTES
"Rob Wynn  32 y.o. male  Chief Complaint   Patient presents with   • Psych Eval     \"I was just released from here, but I got back to my hotel and my paranoia went through the roof.\" Denies hallucinations at this time. HX of bipolar disorder. Denies ETOH and substance abuse. Denies SI/HI       Pt amb to triage with steady gait for above complaint. Pt is calm and cooperative.   Pt is alert and oriented, speaking in full sentences, follows commands and responds appropriately to questions. NAD. Resp are even and unlabored.    Pt placed in lobby. Pt educated on triage process. Pt encouraged to alert staff for any changes.    "

## 2018-04-06 NOTE — DISCHARGE INSTRUCTIONS
Bipolar 1 Disorder  Bipolar 1 disorder is a mental health disorder in which a person has episodes of emotional highs (sudha), and may also have episodes of emotional lows (depression) in addition to highs. Bipolar 1 disorder is different from other bipolar disorders because it involves extreme manic episodes. These episodes last at least one week or involve symptoms that are so severe that hospitalization is needed to keep the person safe.  What increases the risk?  The cause of this condition is not known. However, certain factors make you more likely to have bipolar disorder, such as:  · Having a family member with the disorder.  · An imbalance of certain chemicals in the brain (neurotransmitters).  · Stress, such as illness, financial problems, or a death.  · Certain conditions that affect the brain or spinal cord (neurologic conditions).  · Brain injury (trauma).  · Having another mental health disorder, such as:  ¨ Obsessive compulsive disorder.  ¨ Schizophrenia.  What are the signs or symptoms?  Symptoms of sudha include:  · Very high self-esteem or self-confidence.  · Decreased need for sleep.  · Unusual talkativeness or feeling a need to keep talking. Speech may be very fast. It may seem like you cannot stop talking.  · Racing thoughts or constant talking, with quick shifts between topics that may or may not be related (flight of ideas).  · Decreased ability to focus or concentrate.  · Increased purposeful activity, such as work, studies, or social activity.  · Increased nonproductive activity. This could be pacing, squirming and fidgeting, or finger and toe tapping.  · Impulsive behavior and poor judgment. This may result in high-risk activities, such as having unprotected sex or spending a lot of money.  Symptoms of depression include:  · Feeling sad, hopeless, or helpless.  · Frequent or uncontrollable crying.  · Lack of feeling or caring about anything.  · Sleeping too much.  · Moving more slowly than  usual.  · Not being able to enjoy things you used to enjoy.  · Wanting to be alone all the time.  · Feeling guilty or worthless.  · Lack of energy or motivation.  · Trouble concentrating or remembering.  · Trouble making decisions.  · Increased appetite.  · Thoughts of death, or the desire to harm yourself.  Sometimes, you may have a mixed mood. This means having symptoms of depression and sudha. Stress can make symptoms worse.  How is this diagnosed?  To diagnose bipolar disorder, your health care provider may ask about your:  · Emotional episodes.  · Medical history.  · Alcohol and drug use. This includes prescription medicines. Certain medical conditions and substances can cause symptoms that seem like bipolar disorder (secondary bipolar disorder).  How is this treated?  Bipolar disorder is a long-term (chronic) illness. It is best controlled with ongoing (continuous) treatment rather than treatment only when symptoms occur. Treatment may include:  · Medicine. Medicine can be prescribed by a provider who specializes in treating mental disorders (psychiatrist).  ¨ Medicines called mood stabilizers are usually prescribed.  ¨ If symptoms occur even while taking a mood stabilizer, other medicines may be added.  · Psychotherapy. Some forms of talk therapy, such as cognitive-behavioral therapy (CBT), can provide support, education, and guidance.  · Coping methods, such as journaling or relaxation exercises. These may include:  ¨ Yoga.  ¨ Meditation.  ¨ Deep breathing.  · Lifestyle changes, such as:  ¨ Limiting alcohol and drug use.  ¨ Exercising regularly.  ¨ Getting plenty of sleep.  ¨ Making healthy eating choices.  A combination of medicine, talk therapy, and coping methods is best. A procedure in which electricity is applied to the brain through the scalp (electroconvulsive therapy) may be used in cases of severe usdha when medicine and psychotherapy work too slowly or do not work.  Follow these instructions at  home:  Activity  · Return to your normal activities as told by your health care provider.  · Find activities that you enjoy, and make time to do them.  · Exercise regularly as told by your health care provider.  Lifestyle  · Limit alcohol intake to no more than 1 drink a day for nonpregnant women and 2 drinks a day for men. One drink equals 12 oz of beer, 5 oz of wine, or 1½ oz of hard liquor.  · Follow a set schedule for eating and sleeping.  · Eat a balanced diet that includes fresh fruits and vegetables, whole grains, low-fat dairy, and lean meat.  · Get 7-8 hours of sleep each night.  General instructions  · Take over-the-counter and prescription medicines only as told by your health care provider.  · Think about joining a support group. Your health care provider may be able to recommend a support group.  · Talk with your family and loved ones about your treatment goals and how they can help.  · Keep all follow-up visits as told by your health care provider. This is important.  Where to find more information:  For more information about bipolar disorder, visit the following websites:  · National [place] on Mental Illness: www.heather.org  · U.S. National [place] of Mental Health: www.nimh.nih.gov  Contact a health care provider if:  · Your symptoms get worse.  · You have side effects from your medicine, and they get worse.  · You have trouble sleeping.  · You have trouble doing daily activities.  · You feel unsafe in your surroundings.  · You are dealing with substance abuse.  Get help right away if:  · You have new symptoms.  · You have thoughts about harming yourself.  · You self-harm.  This information is not intended to replace advice given to you by your health care provider. Make sure you discuss any questions you have with your health care provider.  Document Released: 03/26/2002 Document Revised: 08/13/2017 Document Reviewed: 08/17/2017  Elsevier Interactive Patient Education © 2017 Elsevier Inc.

## 2018-10-08 ENCOUNTER — HOSPITAL ENCOUNTER (EMERGENCY)
Facility: HOSPITAL | Age: 33
Discharge: HOME | End: 2018-10-08
Attending: EMERGENCY MEDICINE | Admitting: EMERGENCY MEDICINE

## 2018-10-08 VITALS
DIASTOLIC BLOOD PRESSURE: 85 MMHG | HEART RATE: 68 BPM | HEIGHT: 74 IN | TEMPERATURE: 99.1 F | BODY MASS INDEX: 32.49 KG/M2 | WEIGHT: 253.19 LBS | SYSTOLIC BLOOD PRESSURE: 146 MMHG | RESPIRATION RATE: 16 BRPM | OXYGEN SATURATION: 98 %

## 2018-10-08 DIAGNOSIS — G47.9 SLEEP DISTURBANCE: Primary | ICD-10-CM

## 2018-10-08 DIAGNOSIS — F31.61 BIPOLAR DISORDER, CURRENT EPISODE MIXED, MILD (HCC): ICD-10-CM

## 2018-10-08 LAB
AMPHET+METHAMPHET UR QL: NEGATIVE
BARBITURATES UR QL SCN: NEGATIVE
BENZODIAZ UR QL SCN: NEGATIVE
CANNABINOIDS SERPL QL: NEGATIVE
COCAINE UR QL: NEGATIVE
ETHANOL BLD-MCNC: <10 MG/DL (ref 0–10)
ETHANOL UR QL: <0.01 %
METHADONE UR QL SCN: NEGATIVE
OPIATES UR QL: NEGATIVE
OXYCODONE UR QL SCN: NEGATIVE

## 2018-10-08 PROCEDURE — 80307 DRUG TEST PRSMV CHEM ANLYZR: CPT | Performed by: EMERGENCY MEDICINE

## 2018-10-08 PROCEDURE — 90791 PSYCH DIAGNOSTIC EVALUATION: CPT

## 2018-10-08 PROCEDURE — 99283 EMERGENCY DEPT VISIT LOW MDM: CPT

## 2018-10-08 RX ORDER — CITALOPRAM 40 MG/1
40 TABLET ORAL DAILY
COMMUNITY

## 2018-10-08 RX ORDER — HYDROXYZINE PAMOATE 50 MG/1
50 CAPSULE ORAL 3 TIMES DAILY PRN
COMMUNITY

## 2018-10-08 RX ORDER — TRAZODONE HYDROCHLORIDE 100 MG/1
100 TABLET ORAL NIGHTLY
Qty: 7 TABLET | Refills: 0 | Status: SHIPPED | OUTPATIENT
Start: 2018-10-08

## 2018-10-08 RX ORDER — RISPERIDONE 2 MG/1
2 TABLET ORAL 2 TIMES DAILY
COMMUNITY

## 2018-10-08 NOTE — ED NOTES
"Pt reports having paranoia x 1 week. Pt reports being diagnosed with bipolar last year, \"this is new to me.\"  Pt also reports being depressed, but denies SI/HI.  Pt reports being sober from cocaine since July 23rd, denies any other drug use.     Sadie Barrow, RN  10/08/18 0115    "

## 2018-10-08 NOTE — CONSULTS
"33 year old single  [facility] male seen for psychiatric evaluation at the request of Dr. Castellon.  Patient presented to the ED with c/o paranoid thoughts for the past week. He admits that he has not slept in 2 days because of these thoughts.   Upon approach the patient is irritable, stating I had woke him from sleep and asked \" can we hurry up here? \"  He does not fully engage in the evaluation and he displays no eye contact during the assessment.   Patient reports a recent dx of Bipolar disorder, which he claims id new to him. He says he has been treated for anxiety and depression, and for substance abuse ( alcohol and cocaine ) in [place] and [place]. Patient has attended AA and currently has a sponsor.  He says he has [facility]. His last use of substances was July 23 rd. He says he has resided at [facility] in [place] in the past as well.  Family hx is positive for mother, grandmother and a cousin with dx of bipolar disorder.  Patient smokes 1/2 ppd.  He denies H/I, S/I, A/V hallucinations,legal and/or abuse issues. Patient is not employed.  Local tx options provided.   Discussed case with Dr. Castellon and he plans to prescribe medication for the patient to help with poor sleep. Plan is to discharge patient back to his place of residence.   "

## 2018-10-08 NOTE — ED PROVIDER NOTES
" EMERGENCY DEPARTMENT ENCOUNTER    CHIEF COMPLAINT  Chief Complaint: Paranoid thoughts  History given by: Patient  History limited by: none  Room Number: 14/14  PMD: Provider, No Known      HPI:  Pt is a 33 y.o. male, Hx of Bipolar disorder and Depression, who presents complaining of paranoid thoughts that began a week ago. Pt reports difficulty sleeping secondary to paranoia. Pt has been \"catnapping\" for the past week and hasn't had a full nights sleep since. Pt states he even tried to drink red bull one night to keep himself awake. Pt denies HI or SI. Pt denies any recent medications changes or missed medication doses. Pt is in [facility] the past [facility] days for EtOH.     Duration:  One week  Onset: gradual  Timing: constant  Location: NA  Radiation: NA  Quality: paranoia  Intensity/Severity: moderate  Progression: unchanged  Associated Symptoms: none  Aggravating Factors: none  Alleviating Factors: none  Previous Episodes: none  Treatment before arrival: none    PAST MEDICAL HISTORY  Active Ambulatory Problems     Diagnosis Date Noted   • No Active Ambulatory Problems     Resolved Ambulatory Problems     Diagnosis Date Noted   • No Resolved Ambulatory Problems     Past Medical History:   Diagnosis Date   • Alcoholism (CMS/[facility])    • Anxiety    • Bipolar 1 disorder (CMS/[facility])    • Depression    • Psychosis (CMS/[facility])    • Substance abuse (CMS/[facility])        PAST SURGICAL HISTORY  Past Surgical History:   Procedure Laterality Date   • DENTAL PROCEDURE      wisdom teeth       FAMILY HISTORY  Family History   Problem Relation Age of Onset   • Bipolar disorder Mother    • Bipolar disorder Maternal Grandmother    • Bipolar disorder Cousin        SOCIAL HISTORY  Social History     Social History   • Marital status: Single     Spouse name: N/A   • Number of children: N/A   • Years of education: N/A     Occupational History   • Not on file.     Social History Main Topics   • Smoking status: Current Every Day Smoker     " Packs/day: 0.50     Types: Cigarettes   • Smokeless tobacco: Not on file   • Alcohol use Yes      Comment: says he's been sober for  76 days   • Drug use: Yes     Types: Cocaine      Comment: sober for 76 days   • Sexual activity: Defer     Other Topics Concern   • Not on file     Social History Narrative   • No narrative on file       ALLERGIES  Aspirin    REVIEW OF SYSTEMS  Review of Systems   Constitutional: Negative for activity change, appetite change and fever.   HENT: Negative for congestion and sore throat.    Eyes: Negative.    Respiratory: Negative for cough and shortness of breath.    Cardiovascular: Negative for chest pain and leg swelling.   Gastrointestinal: Negative for abdominal pain, diarrhea and vomiting.   Endocrine: Negative.    Genitourinary: Negative for decreased urine volume and dysuria.   Musculoskeletal: Negative for neck pain.   Skin: Negative for rash and wound.   Allergic/Immunologic: Negative.    Neurological: Negative for weakness, numbness and headaches.   Hematological: Negative.    Psychiatric/Behavioral: Negative for suicidal ideas. The patient is nervous/anxious (paranoia).    All other systems reviewed and are negative.      PHYSICAL EXAM  ED Triage Vitals   Temp Heart Rate Resp BP SpO2   10/08/18 0025 10/08/18 0025 10/08/18 0025 10/08/18 0038 10/08/18 0025   99.1 °F (37.3 °C) 84 16 142/92 98 %      Temp src Heart Rate Source Patient Position BP Location FiO2 (%)   10/08/18 0025 10/08/18 0025 -- -- --   Tympanic Monitor          Physical Exam   Constitutional: He is oriented to person, place, and time. No distress.   HENT:   Head: Normocephalic and atraumatic.   Eyes: Pupils are equal, round, and reactive to light. EOM are normal.   Neck: Normal range of motion. Neck supple.   Cardiovascular: Normal rate, regular rhythm and normal heart sounds.    Pulmonary/Chest: Effort normal and breath sounds normal. No respiratory distress.   Abdominal: Soft. There is no tenderness. There is  no rebound and no guarding.   Musculoskeletal: Normal range of motion. He exhibits no edema.   Neurological: He is alert and oriented to person, place, and time. He has normal sensation and normal strength.   Skin: Skin is warm and dry.   Psychiatric: Mood and affect normal.   Pt cooperative.    Nursing note and vitals reviewed.      LAB RESULTS  Lab Results (last 24 hours)     Procedure Component Value Units Date/Time    Ethanol [[id number]] Collected:  10/08/18 0119    Specimen:  Blood Updated:  10/08/18 0145     Ethanol <10 mg/dL      Ethanol % <0.010 %     Urine Drug Screen - Urine, Clean Catch [[id number]]  (Normal) Collected:  10/08/18 0122    Specimen:  Urine from Urine, Clean Catch Updated:  10/08/18 0203     Amphet/Methamphet, Screen Negative     Barbiturates Screen, Urine Negative     Benzodiazepine Screen, Urine Negative     Cocaine Screen, Urine Negative     Opiate Screen Negative     THC, Screen, Urine Negative     Methadone Screen, Urine Negative     Oxycodone Screen, Urine Negative    Narrative:       Negative Thresholds For Drugs Screened:     Amphetamines               500 ng/ml   Barbiturates               200 ng/ml   Benzodiazepines            100 ng/ml   Cocaine                    300 ng/ml   Methadone                  300 ng/ml   Opiates                    300 ng/ml   Oxycodone                  100 ng/ml   THC                        50 ng/ml    The Normal Value for all drugs tested is negative. This report includes final unconfirmed screening results to be used for medical treatment purposes only. Unconfirmed results must not be used for non-medical purposes such as employment or legal testing. Clinical consideration should be applied to any drug of abuse test, particulary when unconfirmed results are used.          I ordered the above labs and reviewed the results        PROCEDURES  Procedures      PROGRESS AND CONSULTS       0102  Discussed plan to perform EtOH and urine drug screen. Pt is  agreeable.     0246  Per [facility], pt is being slightly uncooperative. Pt has been to traveling to multiple states. [facility] plans to discharge pt home with resources [facility]. Ordered prescription of trazodone.       MEDICAL DECISION MAKING  Results were reviewed/discussed with the patient and they were also made aware of online access. Pt also made aware that some labs, such as cultures, will not be resulted during ER visit and follow up with PMD is necessary.     MDM  Number of Diagnoses or Management Options  Bipolar disorder, current episode mixed, mild (CMS/HCC):   Sleep disturbance:      Amount and/or Complexity of Data Reviewed  Clinical lab tests: ordered and reviewed (Lab work is unremarkable)           DIAGNOSIS  Final diagnoses:   Sleep disturbance   Bipolar disorder, current episode mixed, mild (CMS/HCC)       DISPOSITION  DISCHARGE    Patient discharged in stable condition.    Reviewed implications of results, diagnosis, meds, responsibility to follow up, warning signs and symptoms of possible worsening, potential complications and reasons to return to ER.    Patient/Family voiced understanding of above instructions.    Discussed plan for discharge, as there is no emergent indication for admission. Patient referred to primary care provider for BP management due to today's BP. Pt/family is agreeable and understands need for follow up and repeat testing.  Pt is aware that discharge does not mean that nothing is wrong but it indicates no emergency is present that requires admission and they must continue care with follow-up as given below or physician of their choice.     FOLLOW-UP  [address] [facility] [address] [phone]             Medication List      New Prescriptions    traZODone 100 MG tablet  Commonly known as:  DESYREL  Take 1 tablet by mouth Every Night.              Latest Documented Vital Signs:  As of 5:24 AM  BP- 146/85 HR- 68 Temp- 99.1 °F  (37.3 °C) (Tympanic) O2 sat- 98%    --  Documentation assistance provided by ryan Bergman for Dr. Castellon.  Information recorded by the scribharriet was done at my direction and has been verified and validated by me.          Harvey Bermgan  10/08/18 0524       Antonio Castellon MD  10/08/18 0617

## 2018-10-09 ENCOUNTER — HOSPITAL ENCOUNTER (EMERGENCY)
Facility: HOSPITAL | Age: 33
Discharge: HOME | End: 2018-10-10
Attending: EMERGENCY MEDICINE | Admitting: EMERGENCY MEDICINE

## 2018-10-09 VITALS
HEART RATE: 88 BPM | SYSTOLIC BLOOD PRESSURE: 142 MMHG | DIASTOLIC BLOOD PRESSURE: 103 MMHG | OXYGEN SATURATION: 96 % | HEIGHT: 74 IN | BODY MASS INDEX: 32.47 KG/M2 | RESPIRATION RATE: 15 BRPM | TEMPERATURE: 99.2 F | WEIGHT: 253 LBS

## 2018-10-09 DIAGNOSIS — F31.9 BIPOLAR AFFECTIVE DISORDER, REMISSION STATUS UNSPECIFIED (HCC): ICD-10-CM

## 2018-10-09 DIAGNOSIS — R44.3 HALLUCINATIONS: Primary | ICD-10-CM

## 2018-10-09 LAB
BASOPHILS # BLD AUTO: 0.03 10*3/MM3 (ref 0–0.2)
BASOPHILS NFR BLD AUTO: 0.3 % (ref 0–1.5)
DEPRECATED RDW RBC AUTO: 46.9 FL (ref 37–54)
EOSINOPHIL # BLD AUTO: 0.25 10*3/MM3 (ref 0–0.7)
EOSINOPHIL NFR BLD AUTO: 2.7 % (ref 0.3–6.2)
ERYTHROCYTE [DISTWIDTH] IN BLOOD BY AUTOMATED COUNT: 14.6 % (ref 11.5–14.5)
HCT VFR BLD AUTO: 46 % (ref 40.4–52.2)
HGB BLD-MCNC: 14.3 G/DL (ref 13.7–17.6)
IMM GRANULOCYTES # BLD: 0.06 10*3/MM3 (ref 0–0.03)
IMM GRANULOCYTES NFR BLD: 0.7 % (ref 0–0.5)
LYMPHOCYTES # BLD AUTO: 2.13 10*3/MM3 (ref 0.9–4.8)
LYMPHOCYTES NFR BLD AUTO: 23.3 % (ref 19.6–45.3)
MCH RBC QN AUTO: 27.3 PG (ref 27–32.7)
MCHC RBC AUTO-ENTMCNC: 31.1 G/DL (ref 32.6–36.4)
MCV RBC AUTO: 88 FL (ref 79.8–96.2)
MONOCYTES # BLD AUTO: 1.15 10*3/MM3 (ref 0.2–1.2)
MONOCYTES NFR BLD AUTO: 12.6 % (ref 5–12)
NEUTROPHILS # BLD AUTO: 5.52 10*3/MM3 (ref 1.9–8.1)
NEUTROPHILS NFR BLD AUTO: 60.4 % (ref 42.7–76)
PLATELET # BLD AUTO: 226 10*3/MM3 (ref 140–500)
PMV BLD AUTO: 10.3 FL (ref 6–12)
RBC # BLD AUTO: 5.23 10*6/MM3 (ref 4.6–6)
WBC NRBC COR # BLD: 9.14 10*3/MM3 (ref 4.5–10.7)

## 2018-10-09 PROCEDURE — 80307 DRUG TEST PRSMV CHEM ANLYZR: CPT | Performed by: PHYSICIAN ASSISTANT

## 2018-10-09 PROCEDURE — 80053 COMPREHEN METABOLIC PANEL: CPT | Performed by: PHYSICIAN ASSISTANT

## 2018-10-09 PROCEDURE — 99283 EMERGENCY DEPT VISIT LOW MDM: CPT

## 2018-10-09 PROCEDURE — 85025 COMPLETE CBC W/AUTO DIFF WBC: CPT | Performed by: PHYSICIAN ASSISTANT

## 2018-10-09 PROCEDURE — 36415 COLL VENOUS BLD VENIPUNCTURE: CPT

## 2018-10-10 LAB
ALBUMIN SERPL-MCNC: 4 G/DL (ref 3.5–5.2)
ALBUMIN/GLOB SERPL: 1.4 G/DL
ALP SERPL-CCNC: 94 U/L (ref 39–117)
ALT SERPL W P-5'-P-CCNC: 37 U/L (ref 1–41)
AMPHET+METHAMPHET UR QL: NEGATIVE
ANION GAP SERPL CALCULATED.3IONS-SCNC: 10.6 MMOL/L
AST SERPL-CCNC: 22 U/L (ref 1–40)
BARBITURATES UR QL SCN: NEGATIVE
BENZODIAZ UR QL SCN: NEGATIVE
BILIRUB SERPL-MCNC: 0.2 MG/DL (ref 0.1–1.2)
BUN BLD-MCNC: 10 MG/DL (ref 6–20)
BUN/CREAT SERPL: 9.9 (ref 7–25)
CALCIUM SPEC-SCNC: 9.1 MG/DL (ref 8.6–10.5)
CANNABINOIDS SERPL QL: NEGATIVE
CHLORIDE SERPL-SCNC: 106 MMOL/L (ref 98–107)
CO2 SERPL-SCNC: 25.4 MMOL/L (ref 22–29)
COCAINE UR QL: NEGATIVE
CREAT BLD-MCNC: 1.01 MG/DL (ref 0.76–1.27)
ETHANOL BLD-MCNC: <10 MG/DL (ref 0–10)
ETHANOL UR QL: <0.01 %
GFR SERPL CREATININE-BSD FRML MDRD: 104 ML/MIN/1.73
GLOBULIN UR ELPH-MCNC: 2.9 GM/DL
GLUCOSE BLD-MCNC: 108 MG/DL (ref 65–99)
METHADONE UR QL SCN: NEGATIVE
OPIATES UR QL: NEGATIVE
OXYCODONE UR QL SCN: NEGATIVE
POTASSIUM BLD-SCNC: 3.8 MMOL/L (ref 3.5–5.2)
PROT SERPL-MCNC: 6.9 G/DL (ref 6–8.5)
SODIUM BLD-SCNC: 142 MMOL/L (ref 136–145)

## 2018-10-10 PROCEDURE — 90791 PSYCH DIAGNOSTIC EVALUATION: CPT
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
## 2018-10-10 NOTE — ED TRIAGE NOTES
Pt states he has had on and off hallucinations since last week. He said they started back tonight. Pt denies thoughts of harm to himself or anyone else.
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
## 2019-02-26 ENCOUNTER — HOSPITAL ENCOUNTER (EMERGENCY)
Age: 34
Discharge: HOME | End: 2019-02-26
Payer: MEDICAID

## 2019-02-26 VITALS
TEMPERATURE: 98.1 F | OXYGEN SATURATION: 97 % | DIASTOLIC BLOOD PRESSURE: 89 MMHG | HEIGHT: 74 IN | BODY MASS INDEX: 30.16 KG/M2 | WEIGHT: 235 LBS | RESPIRATION RATE: 18 BRPM | HEART RATE: 74 BPM | SYSTOLIC BLOOD PRESSURE: 144 MMHG

## 2019-02-26 DIAGNOSIS — F19.10 SUBSTANCE ABUSE (HCC): Primary | ICD-10-CM

## 2019-02-26 LAB
ALBUMIN SERPL-MCNC: 3.8 G/DL (ref 3.5–5)
ALBUMIN/GLOB SERPL: 1 {RATIO} (ref 1.2–3.5)
ALP SERPL-CCNC: 88 U/L (ref 50–136)
ALT SERPL-CCNC: 32 U/L (ref 12–65)
AMPHET UR QL SCN: NEGATIVE
ANION GAP SERPL CALC-SCNC: 6 MMOL/L (ref 7–16)
APAP SERPL-MCNC: <2 UG/ML (ref 10–30)
AST SERPL-CCNC: 22 U/L (ref 15–37)
BARBITURATES UR QL SCN: NEGATIVE
BASOPHILS # BLD: 0.1 K/UL (ref 0–0.2)
BASOPHILS NFR BLD: 1 % (ref 0–2)
BENZODIAZ UR QL: NEGATIVE
BILIRUB SERPL-MCNC: 0.3 MG/DL (ref 0.2–1.1)
BUN SERPL-MCNC: 18 MG/DL (ref 6–23)
CALCIUM SERPL-MCNC: 8.8 MG/DL (ref 8.3–10.4)
CANNABINOIDS UR QL SCN: NEGATIVE
CHLORIDE SERPL-SCNC: 109 MMOL/L (ref 98–107)
CO2 SERPL-SCNC: 25 MMOL/L (ref 21–32)
COCAINE UR QL SCN: POSITIVE
CREAT SERPL-MCNC: 1.15 MG/DL (ref 0.8–1.5)
DIFFERENTIAL METHOD BLD: ABNORMAL
EOSINOPHIL # BLD: 0.1 K/UL (ref 0–0.8)
EOSINOPHIL NFR BLD: 2 % (ref 0.5–7.8)
ERYTHROCYTE [DISTWIDTH] IN BLOOD BY AUTOMATED COUNT: 13.7 % (ref 11.9–14.6)
ETHANOL SERPL-MCNC: <3 MG/DL
GLOBULIN SER CALC-MCNC: 3.9 G/DL (ref 2.3–3.5)
GLUCOSE SERPL-MCNC: 104 MG/DL (ref 65–100)
HCT VFR BLD AUTO: 42.6 % (ref 41.1–50.3)
HGB BLD-MCNC: 13.8 G/DL (ref 13.6–17.2)
IMM GRANULOCYTES # BLD AUTO: 0 K/UL (ref 0–0.5)
IMM GRANULOCYTES NFR BLD AUTO: 0 % (ref 0–5)
LYMPHOCYTES # BLD: 1.8 K/UL (ref 0.5–4.6)
LYMPHOCYTES NFR BLD: 25 % (ref 13–44)
MCH RBC QN AUTO: 27.9 PG (ref 26.1–32.9)
MCHC RBC AUTO-ENTMCNC: 32.4 G/DL (ref 31.4–35)
MCV RBC AUTO: 86.2 FL (ref 79.6–97.8)
METHADONE UR QL: NEGATIVE
MONOCYTES # BLD: 1 K/UL (ref 0.1–1.3)
MONOCYTES NFR BLD: 14 % (ref 4–12)
NEUTS SEG # BLD: 4.2 K/UL (ref 1.7–8.2)
NEUTS SEG NFR BLD: 58 % (ref 43–78)
NRBC # BLD: 0 K/UL (ref 0–0.2)
OPIATES UR QL: NEGATIVE
PCP UR QL: NEGATIVE
PLATELET # BLD AUTO: 218 K/UL (ref 150–450)
PMV BLD AUTO: 10.9 FL (ref 9.4–12.3)
POTASSIUM SERPL-SCNC: 3.8 MMOL/L (ref 3.5–5.1)
PROT SERPL-MCNC: 7.7 G/DL (ref 6.3–8.2)
RBC # BLD AUTO: 4.94 M/UL (ref 4.23–5.6)
SALICYLATES SERPL-MCNC: 4.1 MG/DL (ref 2.8–20)
SODIUM SERPL-SCNC: 140 MMOL/L (ref 136–145)
WBC # BLD AUTO: 7.3 K/UL (ref 4.3–11.1)

## 2019-02-26 PROCEDURE — 80053 COMPREHEN METABOLIC PANEL: CPT

## 2019-02-26 PROCEDURE — 85025 COMPLETE CBC W/AUTO DIFF WBC: CPT

## 2019-02-26 PROCEDURE — 99284 EMERGENCY DEPT VISIT MOD MDM: CPT

## 2019-02-26 PROCEDURE — 81003 URINALYSIS AUTO W/O SCOPE: CPT

## 2019-02-26 PROCEDURE — 80307 DRUG TEST PRSMV CHEM ANLYZR: CPT

## 2019-02-26 NOTE — DISCHARGE INSTRUCTIONS
Patient Education        Learning About Drug Misuse  What is drug misuse? Drug misuse means using drugs in a way that harms you or causes you to harm others. Your doctor may call it substance use disorder or drug abuse. It's possible to misuse almost any type of drug, including illegal drugs, prescription drugs, and over-the-counter drugs. An overdose happens when a person takes more than the normal or recommended amount of a drug. An overdose can result in harmful symptoms or death. Could you have a problem? If there's a chance you may be misusing drugs, it's important to find out. So ask yourself a few questions. · Do you spend a lot of time thinking about your medicine or other drug--getting it and using it? Has that taken the place of other things you used to enjoy? · Have you had problems with your family or friends, or at work, because of your use? · Do you use drugs even when you've told yourself you won't? Do you think you might have a problem? If you do, then you've just taken an important first step. Many people have overcome this problem. And most of them started by reaching out to others, like caring friends or family, their doctor, or a support group. How is drug misuse treated? If you think you may have a problem with drugs, talk to your doctor. You and your doctor can decide whether you have a problem and what type of treatment might help you. You may need to stay in a hospital at first, so that you can be treated for withdrawal symptoms. One of the goals of treatment is helping you get used to life without the drug. Counseling can help you prepare for people or situations that might tempt you to start using again. You can practice these skills through one-on-one counseling, family therapy, or group therapy. Therapy may be part of inpatient treatment, where you stay in a treatment center.  Or it may be part of outpatient treatment, where you can fit your therapy around your job or other responsibilities. Another goal of treatment is getting ongoing support for your drug-free life. Many people find support by going to meetings like Narcotics Anonymous or SMART Recovery. This type of support can help you feel less alone and more motivated to stay drug-free. You might talk to your doctor or do an online search for local treatment programs. Or you might tell a friend or loved one that you need help. Follow-up care is a key part of your treatment and safety. Be sure to make and go to all appointments, and call your doctor if you are having problems. It's also a good idea to know your test results and keep a list of the medicines you take. Where can you learn more? Go to http://escobar-lenin.info/. Enter 278-493-9007 in the search box to learn more about \"Learning About Drug Misuse. \"  Current as of: May 7, 2018  Content Version: 11.9  © 4818-5793 [employer]. Care instructions adapted under license by [employer] (which disclaims liability or warranty for this information). If you have questions about a medical condition or this instruction, always ask your healthcare professional. [place] [address] any warranty or liability for your use of this information.

## 2019-02-26 NOTE — ED NOTES
Patient to ED via EMS. Patient with complaint of SI. Patient states he attempted to OD on cocaine last night. Patient denies any HI. Patient was evaluated at [facility] earlier today- discharged. Patient admits to increasing auditory hallucinations. Patient reports hearing voices, whistles- worse at night. Patient alert/oriented. Patient claims hx of bipolar disorder, depression. No diagnosis of schizophrenia.

## 2019-02-26 NOTE — ED NOTES
I have reviewed discharge instructions with the patient. The patient verbalized understanding. Patient left ED via Discharge Method: ambulatory to Home with self via taxi cab Opportunity for questions and clarification provided. Patient given 0 scripts. Pt in no acute distress at time of discharge To continue your aftercare when you leave the hospital, you may receive an automated call from our care team to check in on how you are doing. This is a free service and part of our promise to provide the best care and service to meet your aftercare needs.  If you have questions, or wish to unsubscribe from this service please call [phone]. Thank you for Choosing our [facility] Emergency Department.

## 2019-02-26 NOTE — ED PROVIDER NOTES
24-year-old male presents with complaint of suicidal thoughts. Patient states that he tried to overdose on cocaine last night. Patient states he is paranoid that people are coming to get him. Patient was just discharged from [facility] for the same complaint and was evaluated by psychiatrist there. The history is provided by the patient. Mental Health Problem This is a chronic problem. Past Medical History:  
Diagnosis Date  Asthma  Ill-defined condition   
 bipolar, depression No past surgical history on file. No family history on file. Social History Socioeconomic History  Marital status: SINGLE Spouse name: Not on file  Number of children: Not on file  Years of education: Not on file  Highest education level: Not on file Social Needs  Financial resource strain: Not on file  Food insecurity - worry: Not on file  Food insecurity - inability: Not on file  Transportation needs - medical: Not on file  Transportation needs - non-medical: Not on file Occupational History  Not on file Tobacco Use  Smoking status: Current Every Day Smoker Packs/day: 0.50  Smokeless tobacco: Never Used Substance and Sexual Activity  Alcohol use: Yes Comment: rarely  Drug use: No  
  Comment: denies reg use. cocaine last night.  Sexual activity: Not on file Other Topics Concern  Not on file Social History Narrative  Not on file ALLERGIES: Asa-acetaminophen-caff-buffers and Aspirin Review of Systems Constitutional: Negative. Negative for activity change. HENT: Negative. Eyes: Negative. Respiratory: Negative. Cardiovascular: Negative. Gastrointestinal: Negative. Genitourinary: Negative. Musculoskeletal: Negative. Skin: Negative. Neurological: Negative. Psychiatric/Behavioral: Positive for sleep disturbance. All other systems reviewed and are negative. Vitals:  
 02/26/19 0054 BP: 144/89 Pulse: 74 Resp: 18 Temp: 98.1 °F (36.7 °C) SpO2: 97% Weight: 106.6 kg (235 lb) Height: 6' 2\" (1.88 m) Physical Exam  
Constitutional: He is oriented to person, place, and time. He appears well-developed and well-nourished. No distress. HENT:  
Head: Normocephalic and atraumatic. Right Ear: External ear normal.  
Left Ear: External ear normal.  
Nose: Nose normal.  
Mouth/Throat: Oropharynx is clear and moist. No oropharyngeal exudate. Eyes: Conjunctivae and EOM are normal. Pupils are equal, round, and reactive to light. Right eye exhibits no discharge. Left eye exhibits no discharge. No scleral icterus. Neck: Normal range of motion. Neck supple. No JVD present. No tracheal deviation present. Cardiovascular: Normal rate, regular rhythm and intact distal pulses. Pulmonary/Chest: Effort normal and breath sounds normal. No stridor. No respiratory distress. He has no wheezes. He exhibits no tenderness. Abdominal: Soft. Bowel sounds are normal. He exhibits no distension and no mass. There is no tenderness. Musculoskeletal: Normal range of motion. He exhibits no edema or tenderness. Neurological: He is alert and oriented to person, place, and time. No cranial nerve deficit. Skin: Skin is warm and dry. No rash noted. He is not diaphoretic. No erythema. No pallor. Psychiatric: He has a normal mood and affect. His behavior is normal. Thought content normal. His mood appears not anxious. His affect is not angry, not blunt, not labile and not inappropriate. He is not agitated, not aggressive and not combative. Cognition and memory are normal. He does not exhibit a depressed mood. Nursing note and vitals reviewed. MDM Number of Diagnoses or Management Options Diagnosis management comments: Patient lying in room with ear buds and was listening to music.   Patient's story was initially different from the documented evaluation at [facility] 4 hours ago. Patient stated he tried to commit suicide last night progressed he was actually last Saturday while in [place]. Patient traveled [place] yesterday and went straight [facility] emergency Department. Patient was discharged there before midnight and came straight to this hospital.  Patient complaint has not changed in the last 4 hours. Review of psychiatric evaluation is malingering and cocaine abuse.his evaluation is consistent with previous evaluation. In discussion with the patient he agreed that he has a safe place to stay tonight with his father. He states he has no money to obtain cocaine tonight. He has several prescriptions which she is taking. He was offered services to assist him with voluntary mental health as well as medications. Patient became cooperative and smiling and interactive and appropriate manner. He is not currently hallucinating. We will assist him with referrals to mental health as a volunteer outpatient basis. Amount and/or Complexity of Data Reviewed Clinical lab tests: ordered and reviewed Tests in the medicine section of CPT®: ordered and reviewed Procedures

## 2019-04-24 ENCOUNTER — HOSPITAL ENCOUNTER (EMERGENCY)
Age: 34
Discharge: HOME | End: 2019-04-25
Attending: EMERGENCY MEDICINE
Payer: MEDICAID

## 2019-04-24 DIAGNOSIS — F20.0 PARANOID SCHIZOPHRENIA (HCC): Primary | ICD-10-CM

## 2019-04-24 LAB
ALBUMIN SERPL-MCNC: 3.7 G/DL (ref 3.5–5)
ALBUMIN/GLOB SERPL: 0.9 {RATIO} (ref 1.2–3.5)
ALP SERPL-CCNC: 89 U/L (ref 50–136)
ALT SERPL-CCNC: 23 U/L (ref 12–65)
AMPHET UR QL SCN: NEGATIVE
ANION GAP SERPL CALC-SCNC: 6 MMOL/L (ref 7–16)
APAP SERPL-MCNC: <2 UG/ML (ref 10–30)
AST SERPL-CCNC: 16 U/L (ref 15–37)
BARBITURATES UR QL SCN: NEGATIVE
BASOPHILS # BLD: 0 K/UL (ref 0–0.2)
BASOPHILS NFR BLD: 1 % (ref 0–2)
BENZODIAZ UR QL: NEGATIVE
BILIRUB SERPL-MCNC: 0.3 MG/DL (ref 0.2–1.1)
BUN SERPL-MCNC: 16 MG/DL (ref 6–23)
CALCIUM SERPL-MCNC: 8.7 MG/DL (ref 8.3–10.4)
CANNABINOIDS UR QL SCN: NEGATIVE
CHLORIDE SERPL-SCNC: 110 MMOL/L (ref 98–107)
CO2 SERPL-SCNC: 26 MMOL/L (ref 21–32)
COCAINE UR QL SCN: NEGATIVE
CREAT SERPL-MCNC: 0.91 MG/DL (ref 0.8–1.5)
DIFFERENTIAL METHOD BLD: ABNORMAL
EOSINOPHIL # BLD: 0.1 K/UL (ref 0–0.8)
EOSINOPHIL NFR BLD: 2 % (ref 0.5–7.8)
ERYTHROCYTE [DISTWIDTH] IN BLOOD BY AUTOMATED COUNT: 14.7 % (ref 11.9–14.6)
ETHANOL SERPL-MCNC: <3 MG/DL
GLOBULIN SER CALC-MCNC: 4.3 G/DL (ref 2.3–3.5)
GLUCOSE SERPL-MCNC: 76 MG/DL (ref 65–100)
HCT VFR BLD AUTO: 44.6 % (ref 41.1–50.3)
HGB BLD-MCNC: 13.8 G/DL (ref 13.6–17.2)
IMM GRANULOCYTES # BLD AUTO: 0 K/UL (ref 0–0.5)
IMM GRANULOCYTES NFR BLD AUTO: 0 % (ref 0–5)
LYMPHOCYTES # BLD: 2.2 K/UL (ref 0.5–4.6)
LYMPHOCYTES NFR BLD: 28 % (ref 13–44)
MCH RBC QN AUTO: 28.2 PG (ref 26.1–32.9)
MCHC RBC AUTO-ENTMCNC: 30.9 G/DL (ref 31.4–35)
MCV RBC AUTO: 91.2 FL (ref 79.6–97.8)
METHADONE UR QL: NEGATIVE
MONOCYTES # BLD: 0.9 K/UL (ref 0.1–1.3)
MONOCYTES NFR BLD: 11 % (ref 4–12)
NEUTS SEG # BLD: 4.5 K/UL (ref 1.7–8.2)
NEUTS SEG NFR BLD: 58 % (ref 43–78)
NRBC # BLD: 0 K/UL (ref 0–0.2)
OPIATES UR QL: NEGATIVE
PCP UR QL: NEGATIVE
PLATELET # BLD AUTO: 154 K/UL (ref 150–450)
PMV BLD AUTO: 10.8 FL (ref 9.4–12.3)
POTASSIUM SERPL-SCNC: 4 MMOL/L (ref 3.5–5.1)
PROT SERPL-MCNC: 8 G/DL (ref 6.3–8.2)
RBC # BLD AUTO: 4.89 M/UL (ref 4.23–5.6)
SALICYLATES SERPL-MCNC: 4.1 MG/DL (ref 2.8–20)
SODIUM SERPL-SCNC: 142 MMOL/L (ref 136–145)
VALPROATE SERPL-MCNC: 11 UG/ML (ref 50–100)
WBC # BLD AUTO: 7.7 K/UL (ref 4.3–11.1)

## 2019-04-24 PROCEDURE — 74011250637 HC RX REV CODE- 250/637: Performed by: EMERGENCY MEDICINE

## 2019-04-24 PROCEDURE — 99285 EMERGENCY DEPT VISIT HI MDM: CPT | Performed by: EMERGENCY MEDICINE

## 2019-04-24 PROCEDURE — 85025 COMPLETE CBC W/AUTO DIFF WBC: CPT

## 2019-04-24 PROCEDURE — 80053 COMPREHEN METABOLIC PANEL: CPT

## 2019-04-24 PROCEDURE — 80307 DRUG TEST PRSMV CHEM ANLYZR: CPT

## 2019-04-24 PROCEDURE — 80164 ASSAY DIPROPYLACETIC ACD TOT: CPT

## 2019-04-24 RX ORDER — DIVALPROEX SODIUM 500 MG/1
2000 TABLET, EXTENDED RELEASE ORAL
Status: DISCONTINUED | OUTPATIENT
Start: 2019-04-24 | End: 2019-04-25 | Stop reason: HOSPADM

## 2019-04-24 RX ORDER — ARIPIPRAZOLE 10 MG/1
10 TABLET ORAL
COMMUNITY
Start: 2019-04-06 | End: 2019-04-24

## 2019-04-24 RX ORDER — TRAZODONE HYDROCHLORIDE 50 MG/1
50 TABLET ORAL
COMMUNITY
Start: 2019-03-26 | End: 2019-04-25

## 2019-04-24 RX ORDER — DIVALPROEX SODIUM 500 MG/1
2000 TABLET, DELAYED RELEASE ORAL
COMMUNITY
Start: 2019-03-26 | End: 2019-04-25

## 2019-04-24 RX ORDER — QUETIAPINE FUMARATE 100 MG/1
500 TABLET, FILM COATED ORAL
COMMUNITY
Start: 2019-03-26 | End: 2019-04-25

## 2019-04-24 RX ORDER — QUETIAPINE FUMARATE 100 MG/1
600 TABLET, FILM COATED ORAL
Status: DISCONTINUED | OUTPATIENT
Start: 2019-04-24 | End: 2019-04-25 | Stop reason: HOSPADM

## 2019-04-24 RX ORDER — TRAZODONE HYDROCHLORIDE 50 MG/1
50 TABLET ORAL
Status: DISCONTINUED | OUTPATIENT
Start: 2019-04-24 | End: 2019-04-25 | Stop reason: HOSPADM

## 2019-04-24 RX ADMIN — TRAZODONE HYDROCHLORIDE 50 MG: 50 TABLET ORAL at 23:37

## 2019-04-24 RX ADMIN — QUETIAPINE FUMARATE 600 MG: 100 TABLET ORAL at 23:36

## 2019-04-24 RX ADMIN — DIVALPROEX SODIUM 2000 MG: 500 TABLET, EXTENDED RELEASE ORAL at 23:37

## 2019-04-24 NOTE — ED NOTES
Patient  Yes - Name: Noelle Black Patient  Oriented YES High risk patients are in line of sight at all times Yes Excess equipment/medical supplies not necessary for the care of the patient removed Yes All sharp or dangerous objects are removed from room: including but not limited to belts, pens & pencils, needles, medications, cosmetics, lighters, matches, nail files, watches, necklaces, glass objects, razors, razor blades, knives, aerosol sprays, drawstring pants, shoes, cords (telephone, call bells, etc.) cleaning wipes or other cleaning items, aluminum cans, not permanently attached wall décor Yes Telephone/cell phone removed as well as TV remote (batteries can be swallowed) Yes Patient belongings removed and labeled at nurses station Yes Excess linen is removed from room Yes All plastic bags are removed from the room and replaced with paper trash bags Yes Patient is in gown and using hospital socks with rubber soles Yes No metal, hard eating utensils or hard plates are on meal tray Yes Remove all cleaning agents used by Cordell's Yes Ensure bathroom door key is easily accessible Yes If Crucifix is hanging on a nail, remove Crucifix as well as the nail Yes  
 
 
*If any question above is answered \"No,\" documentation is required.

## 2019-04-24 NOTE — ED NOTES
Pt has 2 belongings bags, one with green shirt, jeans, phone and wallet. Other bag has black [employer] ones in it

## 2019-04-24 NOTE — ED PROVIDER NOTES
Rehana Maxwell is a 34 y.o. male who presents to the ED with a chief complaint of visual hallucination and paranoia. He has not slept much at all. He has history of having this before. He mostly just hears voices. He sees shadows of mishapen people. He has paranoia that someone is going to come get him and kill him. He sees [facility] he takes seroquel, depakote, and trazodone for the last 4 weeks. He has been committed to [facility] about 4 weeks ago. No si or hi. He has history of bipolar with psychosis. Past Medical History:  
Diagnosis Date  Asthma  Ill-defined condition   
 bipolar, depression No past surgical history on file. No family history on file. Social History Socioeconomic History  Marital status: SINGLE Spouse name: Not on file  Number of children: Not on file  Years of education: Not on file  Highest education level: Not on file Occupational History  Not on file Social Needs  Financial resource strain: Not on file  Food insecurity:  
  Worry: Not on file Inability: Not on file  Transportation needs:  
  Medical: Not on file Non-medical: Not on file Tobacco Use  Smoking status: Current Every Day Smoker Packs/day: 0.50  Smokeless tobacco: Never Used Substance and Sexual Activity  Alcohol use: Yes Comment: rarely  Drug use: No  
  Comment: denies reg use. cocaine last night.  Sexual activity: Not on file Lifestyle  Physical activity:  
  Days per week: Not on file Minutes per session: Not on file  Stress: Not on file Relationships  Social connections:  
  Talks on phone: Not on file Gets together: Not on file Attends Oriental orthodox service: Not on file Active member of club or organization: Not on file Attends meetings of clubs or organizations: Not on file Relationship status: Not on file  Intimate partner violence: Fear of current or ex partner: Not on file Emotionally abused: Not on file Physically abused: Not on file Forced sexual activity: Not on file Other Topics Concern  Not on file Social History Narrative  Not on file ALLERGIES: Asa-acetaminophen-caff-buffers and Aspirin Review of Systems Constitutional: Negative for chills and fever. Gastrointestinal: Negative for diarrhea, nausea and vomiting. Psychiatric/Behavioral: Positive for decreased concentration and dysphoric mood. Negative for agitation, behavioral problems, confusion, hallucinations and suicidal ideas. The patient is nervous/anxious. The patient is not hyperactive. All other systems reviewed and are negative. Vitals:  
 04/24/19 0408 BP: (!) 133/95 Pulse: 78 Resp: 20 Temp: 98.4 °F (36.9 °C) SpO2: 100% Weight: 111.1 kg (245 lb) Height: 6' 2\" (1.88 m) Physical Exam  
Constitutional: He appears well-developed and well-nourished. No distress. HENT:  
Head: Normocephalic and atraumatic. Eyes: Conjunctivae are normal. No scleral icterus. Neck: No tracheal deviation present. Pulmonary/Chest: Effort normal. No respiratory distress. Neurological: He is alert. Psychiatric: His behavior is normal. Judgment normal. His mood appears anxious. His speech is not rapid and/or pressured, not delayed, not tangential and not slurred. Thought content is paranoid and delusional. Cognition and memory are normal. He expresses no homicidal and no suicidal ideation. He expresses no suicidal plans and no homicidal plans. He is communicative. Nursing note and vitals reviewed. MDM Number of Diagnoses or Management Options Diagnosis management comments: Patient as some chronic paranoia and hallucination states it's gotten worse. He states he is taking his medications.   He has been evaluated multiple times at [facility] but his medications do not seem to be working well. I have put counseled him for psychiatry to see patient to evaluate the best plan to determine inpatient versus outpatient management and for medication recommendations. We are still waiting on this consult. Vane Perez MD; 4/24/2019 @7:47 AM Voice dictation software was used during the making of this note. This software is not perfect and grammatical and other typographical errors may be present. This note has not been proofread for errors. 
=================================================================== Amount and/or Complexity of Data Reviewed Clinical lab tests: ordered and reviewed (Results for orders placed or performed during the hospital encounter of 04/24/19 
-ACETAMINOPHEN Result                      Value             Ref Range Acetaminophen level         <2 (L)            10.0 - 30.0 * 
-ETHYL ALCOHOL Result                      Value             Ref Range ALCOHOL(ETHYL),SERUM        <3                MG/DL         
-CBC WITH AUTOMATED DIFF Result                      Value             Ref Range WBC                         7.7               4.3 - 11.1 K* 
     RBC                         4.89              4.23 - 5.6 M* HGB                         13.8              13.6 - 17.2 * HCT                         44.6              41.1 - 50.3 % MCV                         91.2              79.6 - 97.8 * MCH                         28.2              26.1 - 32.9 * MCHC                        30.9 (L)          31.4 - 35.0 * RDW                         14.7 (H)          11.9 - 14.6 % PLATELET                    154               150 - 450 K/* MPV                         10.8              9.4 - 12.3 FL ABSOLUTE NRBC               0.00              0.0 - 0.2 K/* DF                          AUTOMATED NEUTROPHILS                 58                43 - 78 % LYMPHOCYTES                 28                13 - 44 % MONOCYTES                   11                4.0 - 12.0 % EOSINOPHILS                 2                 0.5 - 7.8 % BASOPHILS                   1                 0.0 - 2.0 % IMMATURE GRANULOCYTES       0                 0.0 - 5.0 %   
     ABS. NEUTROPHILS            4.5               1.7 - 8.2 K/* ABS. LYMPHOCYTES            2.2               0.5 - 4.6 K/* ABS. MONOCYTES              0.9               0.1 - 1.3 K/* ABS. EOSINOPHILS            0.1               0.0 - 0.8 K/* ABS. BASOPHILS              0.0               0.0 - 0.2 K/* ABS. IMM. GRANS.            0.0               0.0 - 0.5 K/* 
-METABOLIC PANEL, COMPREHENSIVE Result                      Value             Ref Range Sodium                      142               136 - 145 mm* Potassium                   4.0               3.5 - 5.1 mm* Chloride                    110 (H)           98 - 107 mmo* CO2                         26                21 - 32 mmol* Anion gap                   6 (L)             7 - 16 mmol/L Glucose                     76                65 - 100 mg/* BUN                         16                6 - 23 MG/DL Creatinine                  0.91              0.8 - 1.5 MG* 
     GFR est AA                  >60               >60 ml/min/1* GFR est non-AA              >60               >60 ml/min/1* Calcium                     8.7               8.3 - 10.4 M* Bilirubin, total            0.3               0.2 - 1.1 MG* ALT (SGPT)                  23                12 - 65 U/L   
     AST (SGOT)                  16                15 - 37 U/L Alk. phosphatase            89                50 - 136 U/L      Protein, total              8.0               6.3 - 8.2 g/* 
 Albumin                     3.7               3.5 - 5.0 g/* Globulin                    4.3 (H)           2.3 - 3.5 g/* A-G Ratio                   0.9 (L)           1.2 - 3.5     
-DRUG SCREEN, URINE Result                      Value             Ref Range PCP(PHENCYCLIDINE)          NEGATIVE BENZODIAZEPINES             NEGATIVE                        
     COCAINE                     NEGATIVE AMPHETAMINES                NEGATIVE METHADONE                   NEGATIVE                        
     THC (TH-CANNABINOL)         NEGATIVE                        
     OPIATES                     NEGATIVE                        
     BARBITURATES                NEGATIVE                        
-SALICYLATE Result                      Value             Ref Range Salicylate level            4.1               2.8 - 20.0 M* ) Procedures

## 2019-04-24 NOTE — ED TRIAGE NOTES
Patient states insomnia, auditory and visual hallucinations, paranoia. States that he has only slept 5 hours in the past 5 days. States that he thinks people are out to kill him. States that he is followed by [facility] and takes his medications like he is supposed to. Takes Seroquel, Depakote, trazodone. Patient denies suicidal ideations

## 2019-04-24 NOTE — ED NOTES
[facility] assumed at this time. telepsych at bedside

## 2019-04-25 VITALS
SYSTOLIC BLOOD PRESSURE: 125 MMHG | DIASTOLIC BLOOD PRESSURE: 85 MMHG | RESPIRATION RATE: 16 BRPM | HEIGHT: 74 IN | BODY MASS INDEX: 31.44 KG/M2 | TEMPERATURE: 98.4 F | HEART RATE: 80 BPM | OXYGEN SATURATION: 97 % | WEIGHT: 245 LBS

## 2019-04-25 NOTE — ED NOTES
Patient  Yes - Name: Jammie Santoyo Patient  Oriented YES High risk patients are in line of sight at all times Yes Excess equipment/medical supplies not necessary for the care of the patient removed Yes All sharp or dangerous objects are removed from room: including but not limited to belts, pens & pencils, needles, medications, cosmetics, lighters, matches, nail files, watches, necklaces, glass objects, razors, razor blades, knives, aerosol sprays, drawstring pants, shoes, cords (telephone, call bells, etc.) cleaning wipes or other cleaning items, aluminum cans, not permanently attached wall décor Yes Telephone/cell phone removed as well as TV remote (batteries can be swallowed) Yes Patient belongings removed and labeled at nurses station Yes Excess linen is removed from room Yes All plastic bags are removed from the room and replaced with paper trash bags Yes Patient is in gown and using hospital socks with rubber soles Yes No metal, hard eating utensils or hard plates are on meal tray Yes Remove all cleaning agents used by Cordell's Yes Ensure bathroom door key is easily accessible Yes If Crucifix is hanging on a nail, remove Crucifix as well as the nail Yes  
 
 
*If any question above is answered \"No,\" documentation is required.

## 2019-04-25 NOTE — ED NOTES
Patient  Yes - Name: Rahat Barker Patient  Oriented YES High risk patients are in line of sight at all times Yes Excess equipment/medical supplies not necessary for the care of the patient removed Yes All sharp or dangerous objects are removed from room: including but not limited to belts, pens & pencils, needles, medications, cosmetics, lighters, matches, nail files, watches, necklaces, glass objects, razors, razor blades, knives, aerosol sprays, drawstring pants, shoes, cords (telephone, call bells, etc.) cleaning wipes or other cleaning items, aluminum cans, not permanently attached wall décor Yes Telephone/cell phone removed as well as TV remote (batteries can be swallowed) Yes Patient belongings removed and labeled at nurses station Yes Excess linen is removed from room Yes All plastic bags are removed from the room and replaced with paper trash bags Yes Patient is in gown and using hospital socks with rubber soles Yes No metal, hard eating utensils or hard plates are on meal tray Yes Remove all cleaning agents used by Cordell's Yes Ensure bathroom door key is easily accessible Yes If Crucifix is hanging on a nail, remove Crucifix as well as the nail Yes  
 
 
*If any question above is answered \"No,\" documentation is required.

## 2019-04-25 NOTE — ED NOTES
4-25-19 
7:44 AM 
Patient asleep but easily awakened for evaluation. Patient without complaint other than stating that food portion size or not large enough. He reports reduction in paranoia and hallucinations. On exam he is regular rate and rhythm without murmurs gallops or gallops and lungs are clear to auscultation bilaterally. He is awake alert and oriented. Answers questions appropriately. Awaiting placement. Commitment papers up to date. Medications being provided.  
 
Farheen Duenas MD

## 2019-04-25 NOTE — PROGRESS NOTES
This RN gave EMTALA and appropriate documentation to police for transport. All of the patient's belongings have been sent with patient

## 2019-04-25 NOTE — ED NOTES
Patient  Yes - Name: Christopher Blas Patient  Oriented YES High risk patients are in line of sight at all times Yes Excess equipment/medical supplies not necessary for the care of the patient removed Yes All sharp or dangerous objects are removed from room: including but not limited to belts, pens & pencils, needles, medications, cosmetics, lighters, matches, nail files, watches, necklaces, glass objects, razors, razor blades, knives, aerosol sprays, drawstring pants, shoes, cords (telephone, call bells, etc.) cleaning wipes or other cleaning items, aluminum cans, not permanently attached wall décor Yes Telephone/cell phone removed as well as TV remote (batteries can be swallowed) Yes Patient belongings removed and labeled at nurses station Yes Excess linen is removed from room Yes All plastic bags are removed from the room and replaced with paper trash bags Yes Patient is in gown and using hospital socks with rubber soles Yes No metal, hard eating utensils or hard plates are on meal tray Yes Remove all cleaning agents used by Cordell's Yes Ensure bathroom door key is easily accessible Yes If Crucifix is hanging on a nail, remove Crucifix as well as the nail Yes  
 
 
*If any question above is answered \"No,\" documentation is required.

## 2020-06-23 ENCOUNTER — HOSPITAL ENCOUNTER (EMERGENCY)
Facility: MEDICAL CENTER | Age: 35
End: 2020-06-23

## 2021-04-05 ENCOUNTER — HOSPITAL ENCOUNTER (EMERGENCY)
Age: 36
Discharge: HOME | End: 2021-04-05
Attending: EMERGENCY MEDICINE

## 2021-04-05 VITALS
DIASTOLIC BLOOD PRESSURE: 97 MMHG | RESPIRATION RATE: 17 BRPM | BODY MASS INDEX: 34.65 KG/M2 | HEIGHT: 74 IN | HEART RATE: 74 BPM | WEIGHT: 270 LBS | SYSTOLIC BLOOD PRESSURE: 144 MMHG | OXYGEN SATURATION: 98 % | TEMPERATURE: 98.7 F

## 2021-04-05 DIAGNOSIS — F20.0 PARANOID SCHIZOPHRENIA (HCC): Primary | ICD-10-CM

## 2021-04-05 LAB
ALBUMIN SERPL-MCNC: 3.7 G/DL (ref 3.5–5)
ALBUMIN/GLOB SERPL: 0.9 {RATIO} (ref 1.2–3.5)
ALP SERPL-CCNC: 114 U/L (ref 50–136)
ALT SERPL-CCNC: 33 U/L (ref 12–65)
AMPHET UR QL SCN: NEGATIVE
ANION GAP SERPL CALC-SCNC: 5 MMOL/L (ref 7–16)
APPEARANCE UR: CLEAR
AST SERPL-CCNC: 15 U/L (ref 15–37)
BARBITURATES UR QL SCN: NEGATIVE
BASOPHILS # BLD: 0.1 K/UL (ref 0–0.2)
BASOPHILS NFR BLD: 1 % (ref 0–2)
BENZODIAZ UR QL: NEGATIVE
BILIRUB SERPL-MCNC: 0.3 MG/DL (ref 0.2–1.1)
BILIRUB UR QL: NEGATIVE
BUN SERPL-MCNC: 16 MG/DL (ref 6–23)
CALCIUM SERPL-MCNC: 8.9 MG/DL (ref 8.3–10.4)
CANNABINOIDS UR QL SCN: NEGATIVE
CHLORIDE SERPL-SCNC: 106 MMOL/L (ref 98–107)
CO2 SERPL-SCNC: 28 MMOL/L (ref 21–32)
COCAINE UR QL SCN: NEGATIVE
COLOR UR: YELLOW
CREAT SERPL-MCNC: 1.07 MG/DL (ref 0.8–1.5)
DIFFERENTIAL METHOD BLD: ABNORMAL
EOSINOPHIL # BLD: 0.1 K/UL (ref 0–0.8)
EOSINOPHIL NFR BLD: 1 % (ref 0.5–7.8)
ERYTHROCYTE [DISTWIDTH] IN BLOOD BY AUTOMATED COUNT: 13.6 % (ref 11.9–14.6)
ETHANOL SERPL-MCNC: <3 MG/DL
GLOBULIN SER CALC-MCNC: 3.9 G/DL (ref 2.3–3.5)
GLUCOSE SERPL-MCNC: 98 MG/DL (ref 65–100)
GLUCOSE UR STRIP.AUTO-MCNC: NEGATIVE MG/DL
HCT VFR BLD AUTO: 43 % (ref 41.1–50.3)
HGB BLD-MCNC: 13.5 G/DL (ref 13.6–17.2)
HGB UR QL STRIP: NEGATIVE
IMM GRANULOCYTES # BLD AUTO: 0.1 K/UL (ref 0–0.5)
IMM GRANULOCYTES NFR BLD AUTO: 1 % (ref 0–5)
KETONES UR QL STRIP.AUTO: NEGATIVE MG/DL
LEUKOCYTE ESTERASE UR QL STRIP.AUTO: NEGATIVE
LYMPHOCYTES # BLD: 1.3 K/UL (ref 0.5–4.6)
LYMPHOCYTES NFR BLD: 15 % (ref 13–44)
MCH RBC QN AUTO: 27.5 PG (ref 26.1–32.9)
MCHC RBC AUTO-ENTMCNC: 31.4 G/DL (ref 31.4–35)
MCV RBC AUTO: 87.6 FL (ref 79.6–97.8)
METHADONE UR QL: NEGATIVE
MONOCYTES # BLD: 0.9 K/UL (ref 0.1–1.3)
MONOCYTES NFR BLD: 10 % (ref 4–12)
NEUTS SEG # BLD: 6 K/UL (ref 1.7–8.2)
NEUTS SEG NFR BLD: 72 % (ref 43–78)
NITRITE UR QL STRIP.AUTO: NEGATIVE
NRBC # BLD: 0 K/UL (ref 0–0.2)
OPIATES UR QL: NEGATIVE
PCP UR QL: NEGATIVE
PH UR STRIP: 6.5 [PH] (ref 5–9)
PLATELET # BLD AUTO: 230 K/UL (ref 150–450)
PMV BLD AUTO: 10.7 FL (ref 9.4–12.3)
POTASSIUM SERPL-SCNC: 4 MMOL/L (ref 3.5–5.1)
PROT SERPL-MCNC: 7.6 G/DL (ref 6.3–8.2)
PROT UR STRIP-MCNC: NEGATIVE MG/DL
RBC # BLD AUTO: 4.91 M/UL (ref 4.23–5.6)
SODIUM SERPL-SCNC: 139 MMOL/L (ref 138–145)
SP GR UR REFRACTOMETRY: 1.01 (ref 1–1.02)
UROBILINOGEN UR QL STRIP.AUTO: 0.2 EU/DL (ref 0.2–1)
WBC # BLD AUTO: 8.5 K/UL (ref 4.3–11.1)

## 2021-04-05 PROCEDURE — 85025 COMPLETE CBC W/AUTO DIFF WBC: CPT

## 2021-04-05 PROCEDURE — 80053 COMPREHEN METABOLIC PANEL: CPT

## 2021-04-05 PROCEDURE — 81003 URINALYSIS AUTO W/O SCOPE: CPT

## 2021-04-05 PROCEDURE — 82077 ASSAY SPEC XCP UR&BREATH IA: CPT

## 2021-04-05 PROCEDURE — 99284 EMERGENCY DEPT VISIT MOD MDM: CPT

## 2021-04-05 PROCEDURE — 80307 DRUG TEST PRSMV CHEM ANLYZR: CPT

## 2021-04-05 NOTE — DISCHARGE INSTRUCTIONS
Follow-up with [facility] today for your injection of Invega. Follow-up with PCP. Return to ED if symptoms worsen or progress in any way.

## 2021-04-05 NOTE — ED TRIAGE NOTES
Pt in via EMS from home after missing his Invega doses for the past couple weeks and not feeling well with hallucinations and paranoia. Pt arrived with mask from home in place.  per EMS. Pt denies SI/HI.

## 2021-04-05 NOTE — ED PROVIDER NOTES
61-year-old male with history of of bipolar disorder, paranoid schizophrenia who presents with complaint of worsening auditory hallucinations and paranoid delusions over the past several days. Patient reports that he receives his Invega injection every 3 months at [facility]. States that he missed his most recent injection 2 weeks ago as he was out of town. Patient denies chest pain, shortness of breath, headache, nausea, vomiting, fever, chills, abdominal pain. Patient denies any alcohol or illicit drug use. Denies SI, HI. The history is provided by the patient. No  was used. Mental Health Problem   This is a chronic problem. The current episode started more than 2 days ago. The problem has not changed since onset. Associated symptoms include delusions and hallucinations. Pertinent negatives include no confusion, no somnolence, no seizures, no weakness, no agitation, no self-injury and no violence. Past Medical History:   Diagnosis Date    Asthma     Ill-defined condition     bipolar, depression    Psychiatric disorder        History reviewed. No pertinent surgical history. History reviewed. No pertinent family history. Social History     Socioeconomic History    Marital status: SINGLE     Spouse name: Not on file    Number of children: Not on file    Years of education: Not on file    Highest education level: Not on file   Occupational History    Not on file   Social Needs    Financial resource strain: Not on file    Food insecurity     Worry: Not on file     Inability: Not on file    Transportation needs     Medical: Not on file     Non-medical: Not on file   Tobacco Use    Smoking status: Current Every Day Smoker     Packs/day: 0.50    Smokeless tobacco: Never Used   Substance and Sexual Activity    Alcohol use: Yes     Comment: rarely    Drug use: No     Comment: denies reg use. cocaine last night.      Sexual activity: Not on file Lifestyle    Physical activity     Days per week: Not on file     Minutes per session: Not on file    Stress: Not on file   Relationships    Social connections     Talks on phone: Not on file     Gets together: Not on file     Attends Oriental orthodox service: Not on file     Active member of club or organization: Not on file     Attends meetings of clubs or organizations: Not on file     Relationship status: Not on file    Intimate partner violence     Fear of current or ex partner: Not on file     Emotionally abused: Not on file     Physically abused: Not on file     Forced sexual activity: Not on file   Other Topics Concern    Not on file   Social History Narrative    Not on file         ALLERGIES: Asa-acetaminophen-caff-buffers and Aspirin    Review of Systems   Constitutional: Negative for chills, fatigue and fever. HENT: Negative for congestion and rhinorrhea. Eyes: Negative for visual disturbance. Respiratory: Negative for cough and shortness of breath. Cardiovascular: Negative for chest pain. Gastrointestinal: Negative for abdominal pain, diarrhea, nausea and vomiting. Genitourinary: Negative for dysuria and flank pain. Musculoskeletal: Negative for back pain and myalgias. Neurological: Negative for dizziness, seizures, weakness and headaches. Psychiatric/Behavioral: Positive for hallucinations. Negative for agitation, behavioral problems, confusion, self-injury and suicidal ideas. Vitals:    04/05/21 0302   BP: (!) 144/97   Pulse: 74   Resp: 17   Temp: 98.7 °F (37.1 °C)   SpO2: 98%   Weight: 122.5 kg (270 lb)   Height: 6' 2\" (1.88 m)            Physical Exam  Vitals signs and nursing note reviewed. Constitutional:       Appearance: Normal appearance. HENT:      Head: Normocephalic. Nose: Nose normal.      Mouth/Throat:      Mouth: Mucous membranes are moist.   Eyes:      Extraocular Movements: Extraocular movements intact.       Pupils: Pupils are equal, round, and reactive to light. Neck:      Musculoskeletal: No neck rigidity. Cardiovascular:      Rate and Rhythm: Normal rate. Pulses: Normal pulses. Pulmonary:      Effort: Pulmonary effort is normal.      Breath sounds: Normal breath sounds. Comments: CTAB. Abdominal:      General: Bowel sounds are normal.      Palpations: Abdomen is soft. Tenderness: There is no abdominal tenderness. There is no guarding or rebound. Musculoskeletal: Normal range of motion. Skin:     General: Skin is warm. Findings: No erythema. Neurological:      General: No focal deficit present. Mental Status: He is alert and oriented to person, place, and time. Motor: No weakness. Comments: No focal deficits. Psychiatric:         Attention and Perception: Attention normal.         Mood and Affect: Mood normal.         Speech: Speech normal.         Behavior: Behavior is cooperative. Thought Content: Thought content is paranoid and delusional. Thought content does not include homicidal or suicidal ideation. Thought content does not include homicidal or suicidal plan. Comments: Denies SI, HI.           MDM  Number of Diagnoses or Management Options  Paranoid schizophrenia [facility]): new and requires workup  Diagnosis management comments: Vital signs stable. Basic labs pending. Psychiatry consulted. Awaiting recommendations  ========================================  Labs unremarkable. Have been waiting for psychiatric evaluation for over 4 hours. Patient alert and oriented x4. Patient denies any current SI, HI, AVH. Patient currently not gravely disabled. Patient not a threat at rest to himself or others. Patient states that he will follow-up with [facility] today to see if he can receive Invega injection. Patient given strict return precautions.        Amount and/or Complexity of Data Reviewed  Clinical lab tests: ordered and reviewed  Tests in the medicine section of CPT®: ordered and reviewed  Review and summarize past medical records: yes  Discuss the patient with other providers: yes  Independent visualization of images, tracings, or specimens: yes    Risk of Complications, Morbidity, and/or Mortality  Presenting problems: moderate  Diagnostic procedures: low  Management options: moderate    Patient Progress  Patient progress: stable    ED Course as of Apr 05 0709   Mon Apr 05, 2021   0445 Labs are unremarkable. UDS negative. [DF]   [id number] Psych was consulted 3-4  hours ago and and have not contacted to speak with patient. [place] states she was told by [facility] it would be another 45 minutes. [DF]      ED Course User Index  [DF] Stephenie Cesar MD       Procedures        Results Include:    Recent Results (from the past 24 hour(s))   CBC WITH AUTOMATED DIFF    Collection Time: 04/05/21  3:17 AM   Result Value Ref Range    WBC 8.5 4.3 - 11.1 K/uL    RBC 4.91 4.23 - 5.6 M/uL    HGB 13.5 (L) 13.6 - 17.2 g/dL    HCT 43.0 41.1 - 50.3 %    MCV 87.6 79.6 - 97.8 FL    MCH 27.5 26.1 - 32.9 PG    MCHC 31.4 31.4 - 35.0 g/dL    RDW 13.6 11.9 - 14.6 %    PLATELET 666 466 - 360 K/uL    MPV 10.7 9.4 - 12.3 FL    ABSOLUTE NRBC 0.00 0.0 - 0.2 K/uL    NEUTROPHILS 72 43 - 78 %    LYMPHOCYTES 15 13 - 44 %    MONOCYTES 10 4.0 - 12.0 %    EOSINOPHILS 1 0.5 - 7.8 %    BASOPHILS 1 0.0 - 2.0 %    IMMATURE GRANULOCYTES 1 0.0 - 5.0 %    ABS. NEUTROPHILS 6.0 1.7 - 8.2 K/UL    ABS. LYMPHOCYTES 1.3 0.5 - 4.6 K/UL    ABS. MONOCYTES 0.9 0.1 - 1.3 K/UL    ABS. EOSINOPHILS 0.1 0.0 - 0.8 K/UL    ABS. BASOPHILS 0.1 0.0 - 0.2 K/UL    ABS. IMM.  GRANS. 0.1 0.0 - 0.5 K/UL    DF AUTOMATED     METABOLIC PANEL, COMPREHENSIVE    Collection Time: 04/05/21  3:17 AM   Result Value Ref Range    Sodium 139 138 - 145 mmol/L    Potassium 4.0 3.5 - 5.1 mmol/L    Chloride 106 98 - 107 mmol/L    CO2 28 21 - 32 mmol/L    Anion gap 5 (L) 7 - 16 mmol/L    Glucose 98 65 - 100 mg/dL    BUN 16 6 - 23 MG/DL Creatinine 1.07 0.8 - 1.5 MG/DL    GFR est AA >60 >60 ml/min/1.73m2    GFR est non-AA >60 >60 ml/min/1.73m2    Calcium 8.9 8.3 - 10.4 MG/DL    Bilirubin, total 0.3 0.2 - 1.1 MG/DL    ALT (SGPT) 33 12 - 65 U/L    AST (SGOT) 15 15 - 37 U/L    Alk. phosphatase 114 50 - 136 U/L    Protein, total 7.6 6.3 - 8.2 g/dL    Albumin 3.7 3.5 - 5.0 g/dL    Globulin 3.9 (H) 2.3 - 3.5 g/dL    A-G Ratio 0.9 (L) 1.2 - 3.5     DRUG SCREEN, URINE    Collection Time: 04/05/21  3:17 AM   Result Value Ref Range    PCP(PHENCYCLIDINE) Negative      BENZODIAZEPINES Negative      COCAINE Negative      AMPHETAMINES Negative      METHADONE Negative      THC (TH-CANNABINOL) Negative      OPIATES Negative      BARBITURATES Negative     ETHYL ALCOHOL    Collection Time: 04/05/21  3:17 AM   Result Value Ref Range    ALCOHOL(ETHYL),SERUM <3 MG/DL   URINALYSIS W/ RFLX MICROSCOPIC    Collection Time: 04/05/21  3:17 AM   Result Value Ref Range    Color YELLOW      Appearance CLEAR      Specific gravity 1.012 1.001 - 1.023      pH (UA) 6.5 5.0 - 9.0      Protein Negative NEG mg/dL    Glucose Negative mg/dL    Ketone Negative NEG mg/dL    Bilirubin Negative NEG      Blood Negative NEG      Urobilinogen 0.2 0.2 - 1.0 EU/dL    Nitrites Negative NEG      Leukocyte Esterase Negative NEG                    Capo Burr MD; 4/5/2021 @3:40 AM Voice dictation software was used during the making of this note. This software is not perfect and grammatical and other typographical errors may be present.   This note has not been proofread for errors.  ===================================================================

## 2021-08-24 PROBLEM — T50.902A SUICIDAL OVERDOSE (HCC): Status: ACTIVE | Noted: 2019-03-08

## 2021-08-24 PROBLEM — Z76.5 MALINGERING: Status: ACTIVE | Noted: 2019-02-26

## 2021-08-24 PROBLEM — T14.91XA SUICIDE ATTEMPT (HCC): Status: ACTIVE | Noted: 2019-02-25

## 2021-08-24 PROBLEM — F17.209 TOBACCO USE DISORDER, CONTINUOUS: Status: ACTIVE | Noted: 2019-03-11

## 2021-08-24 PROBLEM — F19.90 SUBSTANCE USE DISORDER: Status: ACTIVE | Noted: 2019-02-25

## 2021-08-24 PROBLEM — F10.10 ALCOHOL USE DISORDER, MILD, ABUSE: Status: ACTIVE | Noted: 2019-03-11

## 2021-08-24 PROBLEM — F14.10 COCAINE USE DISORDER, MILD, ABUSE (HCC): Status: ACTIVE | Noted: 2019-03-11

## 2021-08-24 PROBLEM — F31.5 BIPOLAR I DISORDER, MOST RECENT EPISODE DEPRESSED, SEVERE W PSYCHOSIS (HCC): Status: ACTIVE | Noted: 2019-03-11

## 2021-08-25 NOTE — DISCHARGE PLANNING
ALERT team note:  Brief 1:1 with Mr. Wynn who states he will be ready for discharge tomorrow.  Pleasant and easily engages with good eye contact.  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  He reports no side effects with the Risperdal ordered for him and hopes it is the medication to help control his bipolar disorder; the Wellbutrin ordered by an MD in [place] was not effective.  Probable discharge tomorrow; states he has a 3 day stay at the [facility] already booked.   Statement Selected

## 2022-03-18 PROBLEM — T50.902A SUICIDAL OVERDOSE (HCC): Status: ACTIVE | Noted: 2019-03-08

## 2022-03-18 PROBLEM — F19.90 SUBSTANCE USE DISORDER: Status: ACTIVE | Noted: 2019-02-25

## 2022-03-19 PROBLEM — F10.10 ALCOHOL USE DISORDER, MILD, ABUSE: Status: ACTIVE | Noted: 2019-03-11

## 2022-03-19 PROBLEM — F14.10 COCAINE USE DISORDER, MILD, ABUSE (HCC): Status: ACTIVE | Noted: 2019-03-11

## 2022-03-19 PROBLEM — Z76.5 MALINGERING: Status: ACTIVE | Noted: 2019-02-26

## 2022-03-19 PROBLEM — F17.209 TOBACCO USE DISORDER, CONTINUOUS: Status: ACTIVE | Noted: 2019-03-11

## 2022-03-19 PROBLEM — T14.91XA SUICIDE ATTEMPT (HCC): Status: ACTIVE | Noted: 2019-02-25

## 2022-03-20 PROBLEM — F31.5 BIPOLAR I DISORDER, MOST RECENT EPISODE DEPRESSED, SEVERE W PSYCHOSIS (HCC): Status: ACTIVE | Noted: 2019-03-11

## 2022-03-25 ENCOUNTER — HOSPITAL ENCOUNTER (EMERGENCY)
Age: 37
Discharge: HOME | End: 2022-03-25
Attending: EMERGENCY MEDICINE

## 2022-03-25 VITALS
BODY MASS INDEX: 30.8 KG/M2 | TEMPERATURE: 98.5 F | HEART RATE: 89 BPM | SYSTOLIC BLOOD PRESSURE: 150 MMHG | OXYGEN SATURATION: 97 % | HEIGHT: 74 IN | WEIGHT: 240 LBS | DIASTOLIC BLOOD PRESSURE: 104 MMHG | RESPIRATION RATE: 18 BRPM

## 2022-03-25 DIAGNOSIS — F41.1 ANXIETY STATE: Primary | ICD-10-CM

## 2022-03-25 LAB
ATRIAL RATE: 83 BPM
CALCULATED P AXIS, ECG09: 59 DEGREES
CALCULATED R AXIS, ECG10: 82 DEGREES
CALCULATED T AXIS, ECG11: 66 DEGREES
DIAGNOSIS, 93000: NORMAL
P-R INTERVAL, ECG05: 158 MS
Q-T INTERVAL, ECG07: 348 MS
QRS DURATION, ECG06: 104 MS
QTC CALCULATION (BEZET), ECG08: 408 MS
VENTRICULAR RATE, ECG03: 83 BPM

## 2022-03-25 PROCEDURE — 74011250637 HC RX REV CODE- 250/637: Performed by: EMERGENCY MEDICINE

## 2022-03-25 PROCEDURE — 99283 EMERGENCY DEPT VISIT LOW MDM: CPT

## 2022-03-25 PROCEDURE — 93005 ELECTROCARDIOGRAM TRACING: CPT

## 2022-03-25 RX ORDER — ALPRAZOLAM 0.5 MG/1
2 TABLET ORAL
Status: COMPLETED | OUTPATIENT
Start: 2022-03-25 | End: 2022-03-25

## 2022-03-25 RX ADMIN — ALPRAZOLAM 2 MG: 0.5 TABLET ORAL at 01:45

## 2022-03-25 NOTE — ED TRIAGE NOTES
Arrives with face mask in place. Arrives from home via [facility] with reports anxiety. States recently [place] from [place] believes lost key to mothers home tonight causing anxiety States has not had medications since yesterday AM including xanax and ambien. Reports feels \"overwhelmed. Followed by [facility] for bipolar and schizophrenia. Receives invega injections, last injection 1/2022. Denies SI/HI.  Calm and cooperative on arrival.

## 2022-03-25 NOTE — ED NOTES
When being placed in treatment area by ED medic, pt states \"if yall are gonna give me a piss test, im just gonna go ahead and leave\".

## 2022-03-25 NOTE — ED PROVIDER NOTES
Pt with bipolar and schizophrenia. Has been anxious tonight after getting locked out of mothers house and staying at dad house. His meds are at his moms. She will be back tomorrow but does not have access to his xanax and [place]. Cant sleep. Here now. The history is provided by the patient. No  was used. Anxiety   This is a new problem. The current episode started 1 to 2 hours ago. The problem has not changed since onset. The problem occurs constantly. The pain is associated with normal activity. The patient is experiencing no pain. Pertinent negatives include no back pain, no diaphoresis, no dizziness, no exertional chest pressure, no fever, no headaches, no irregular heartbeat, no leg pain, no malaise/fatigue, no numbness, no orthopnea, no palpitations, no shortness of breath and no weakness. He has tried nothing for the symptoms. His past medical history does not include DM or HTN. Past Medical History:   Diagnosis Date    Asthma     Ill-defined condition     bipolar, depression    Psychiatric disorder        No past surgical history on file. No family history on file. Social History     Socioeconomic History    Marital status: SINGLE     Spouse name: Not on file    Number of children: Not on file    Years of education: Not on file    Highest education level: Not on file   Occupational History    Not on file   Tobacco Use    Smoking status: Current Every Day Smoker     Packs/day: 0.50    Smokeless tobacco: Never Used   Vaping Use    Vaping Use: Never used   Substance and Sexual Activity    Alcohol use: Yes     Comment: rarely    Drug use: No     Comment: denies reg use. cocaine last night.      Sexual activity: Not on file   Other Topics Concern    Not on file   Social History Narrative    Not on file     Social Determinants of Health     Financial Resource Strain:     Difficulty of Paying Living Expenses: Not on file   Food Insecurity:     Worried About Running Out of Food in the Last Year: Not on file    Ran Out of Food in the Last Year: Not on file   Transportation Needs:     Lack of Transportation (Medical): Not on file    Lack of Transportation (Non-Medical): Not on file   Physical Activity:     Days of Exercise per Week: Not on file    Minutes of Exercise per Session: Not on file   Stress:     Feeling of Stress : Not on file   Social Connections:     Frequency of Communication with Friends and Family: Not on file    Frequency of Social Gatherings with Friends and Family: Not on file    Attends Mosque Services: Not on file    Active Member of [address] [place] [address] [employer] or Organizations: Not on file    Attends Club or Organization Meetings: Not on file    Marital Status: Not on file   Intimate Partner Violence:     Fear of Current or Ex-Partner: Not on file    Emotionally Abused: Not on file    Physically Abused: Not on file    Sexually Abused: Not on file   Housing Stability:     Unable to Pay for Housing in the Last Year: Not on file    Number of [place] in the Last Year: Not on file    Unstable Housing in the Last Year: Not on file         ALLERGIES: Asa-acetaminophen-caff-buffers and Aspirin    Review of Systems   Constitutional: Negative for chills, diaphoresis, fever and malaise/fatigue. Eyes: Negative for pain and redness. Respiratory: Negative for chest tightness, shortness of breath and wheezing. Cardiovascular: Negative for chest pain, palpitations, orthopnea and leg swelling. Musculoskeletal: Negative for back pain, gait problem, neck pain and neck stiffness. Skin: Negative for color change and rash. Neurological: Negative for dizziness, weakness, numbness and headaches. Psychiatric/Behavioral: Negative for confusion. The patient is nervous/anxious.         Vitals:    03/25/22 0122   BP: (!) 150/104   Pulse: 89   Resp: 18   Temp: 98.5 °F (36.9 °C)   SpO2: 97%   Weight: 108.9 kg (240 lb)   Height: 6' 2\" (1.88 m)            Physical Exam  Constitutional:       Appearance: Normal appearance. He is well-developed. HENT:      Head: Normocephalic and atraumatic. Cardiovascular:      Rate and Rhythm: Normal rate and regular rhythm. Pulmonary:      Effort: Pulmonary effort is normal.      Breath sounds: Normal breath sounds. Abdominal:      General: Bowel sounds are normal.      Palpations: Abdomen is soft. Tenderness: There is no abdominal tenderness. Musculoskeletal:         General: Normal range of motion. Cervical back: Normal range of motion and neck supple. Skin:     General: Skin is warm and dry. Neurological:      General: No focal deficit present. Mental Status: He is alert and oriented to person, place, and time. Cranial Nerves: No cranial nerve deficit. MDM  Number of Diagnoses or Management Options  Diagnosis management comments: Will give pt a dose of his xanax here and discharge with continued normal home meds tomorrow.      Patient Progress  Patient progress: stable         Procedures

## 2022-06-20 ENCOUNTER — OFFICE VISIT (OUTPATIENT)
Dept: ORTHOPEDIC SURGERY | Age: 37
End: 2022-06-20

## 2022-06-20 DIAGNOSIS — M79.672 PAIN OF BOTH HEELS: Primary | ICD-10-CM

## 2022-06-20 DIAGNOSIS — M79.671 PAIN OF BOTH HEELS: Primary | ICD-10-CM

## 2022-06-20 PROCEDURE — 99213 OFFICE O/P EST LOW 20 MIN: CPT | Performed by: ORTHOPAEDIC SURGERY

## 2022-06-20 PROCEDURE — 20551 NJX 1 TENDON ORIGIN/INSJ: CPT | Performed by: ORTHOPAEDIC SURGERY

## 2022-06-20 RX ORDER — METHYLPREDNISOLONE ACETATE 40 MG/ML
40 INJECTION, SUSPENSION INTRA-ARTICULAR; INTRALESIONAL; INTRAMUSCULAR; SOFT TISSUE ONCE
Status: COMPLETED | OUTPATIENT
Start: 2022-06-20 | End: 2022-06-20

## 2022-06-20 RX ADMIN — METHYLPREDNISOLONE ACETATE 40 MG: 40 INJECTION, SUSPENSION INTRA-ARTICULAR; INTRALESIONAL; INTRAMUSCULAR; SOFT TISSUE at 16:02

## 2022-06-20 NOTE — LETTER
[address] [address]  Phone: [phone]  Fax: [phone]    Alejandra Kim MD        June 20, 2022     Patient: Caryl Dewitt   YOB: 1985   Date of Visit: 6/20/2022       To Whom It May Concern: It is my medical opinion that Elena Frazier should remain out of work until he has no foot pain. If you have any questions or concerns, please don't hesitate to call.     Sincerely,        Alejandra Kim MD

## 2022-06-20 NOTE — PROGRESS NOTES
Name: Shiloh Gamboa  YOB: 1985  Gender: male  MRN: [id number]    He was last seen 03/28/2022:  He reports recurrent heel pain  [facility]     HPI:   7.5 years ago, he reports being treated for plantar fasciitis and wore a boot for several months  May 2021, patient developed onset of right heel pain. Trial of prednisone pack helped short-term  01/03/2022: Dr. Arnulfo Bell office note: Diagnosed with plantar fasciitis, anxiety, schizoaffective bipolar: Prescribed Xanax, Ambien, Ultram and prednisone Sterapred 5 mg pack  01/31/2022: He presented with bilateral heel pain. He had to come out of work because of his severe pain     ROS/Meds/PSH/PMH/FH/SH: reviewed today    Tobacco:  reports that he has been smoking. He has been smoking about 0.50 packs per day. He has never used smokeless tobacco.   Dr. 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 No obvious distress or SOB  CV: appears to have acceptable vascular color and capillary refill  Neuro: appears to have mostly intact light touch sensation   Skin: No soft tissue swelling  MS: Standing: Planovalgus: Uses a cane  Right and left = medial heel/low tarsal tunnel pain; no posterior heel pain  Right and left = full ankle/hindfoot motion; 5/5 strength; no instability or crepitance     XR: Right and left sides: Standing AP lateral mortise ankles both AP oblique feet taken 01/31/2022 with planovalgus deformities with talonavicular arthritis more advanced on the left with dorsal talonavicular spurring on the left; no definite coalition appreciated; bunion;  XR Impression:  As above        03/28/2022:  Injections done today: We discussed risk and complications and decided proceed with injections: After sterile prep, EACH right and left plantar medial heel/plantar fascia was injected with 1 cc Xylocaine and 40 mg Depo-Medrol; he appeared to do well.     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 We explored treatment options.      He continues to struggle with pain. He is under pain management but his feet prevent him from standing and walking at work  He cannot work as a  just sitting so he would like to consider surgery  He has [employer] shoes  He will continue pain management  Advanced medical imaging: No indication today for MRI scan  We discussed foot care and protection  PT: Timothy completed  Orthotic/prosthetic: We discussed prior prescribed: Custom full-length insoles with heel PQ: Accommodative shoes     Medication - OTC meds prn; as needed prior prescribed: Boswellia, Devil's claw, Turmeric-curcumin   Magne sports topical rub with frankincense and myrrh        Surgical discussion: Discussed surgery for plantar fasciitis, low tarsal tunnel syndrome. He would like to proceed with surgery here instead of [facility]    Follow up: [facility]: Dr. Velia Gonzalez or Dr. Jonathan Pillai for surgery  Work status: Out of work until he has no foot pain or feels comfortable returning back to his job as a      This note was created using Dragon voice recognition software which may result in errors of speech and spelling recognition and word/phrase syntax errors.

## 2022-08-31 ENCOUNTER — OFFICE VISIT (OUTPATIENT)
Dept: FAMILY MEDICINE CLINIC | Facility: CLINIC | Age: 37
End: 2022-08-31

## 2022-08-31 VITALS
BODY MASS INDEX: 28.93 KG/M2 | TEMPERATURE: 97.9 F | SYSTOLIC BLOOD PRESSURE: 140 MMHG | HEART RATE: 93 BPM | WEIGHT: 225.4 LBS | DIASTOLIC BLOOD PRESSURE: 100 MMHG | OXYGEN SATURATION: 99 % | HEIGHT: 74 IN

## 2022-08-31 DIAGNOSIS — G47.09 OTHER INSOMNIA: ICD-10-CM

## 2022-08-31 DIAGNOSIS — F41.9 ANXIETY: ICD-10-CM

## 2022-08-31 DIAGNOSIS — F31.9 BIPOLAR DEPRESSION (HCC): Primary | ICD-10-CM

## 2022-08-31 PROCEDURE — 99213 OFFICE O/P EST LOW 20 MIN: CPT | Performed by: FAMILY MEDICINE

## 2022-08-31 RX ORDER — ZOLPIDEM TARTRATE 10 MG/1
10 TABLET ORAL NIGHTLY
Qty: 30 TABLET | Refills: 2 | Status: ON HOLD | OUTPATIENT
Start: 2022-08-31 | End: 2022-10-02 | Stop reason: HOSPADM

## 2022-08-31 RX ORDER — HYDROCODONE BITARTRATE AND ACETAMINOPHEN 10; 325 MG/1; MG/1
1 TABLET ORAL EVERY 8 HOURS PRN
Status: ON HOLD | COMMUNITY
End: 2022-10-02 | Stop reason: HOSPADM

## 2022-08-31 RX ORDER — ALPRAZOLAM 2 MG/1
2 TABLET ORAL 3 TIMES DAILY PRN
Qty: 90 TABLET | Refills: 2 | Status: ON HOLD | OUTPATIENT
Start: 2022-08-31 | End: 2022-10-02 | Stop reason: HOSPADM

## 2022-08-31 ASSESSMENT — PATIENT HEALTH QUESTIONNAIRE - PHQ9
SUM OF ALL RESPONSES TO PHQ QUESTIONS 1-9: 0
3. TROUBLE FALLING OR STAYING ASLEEP: 0
10. IF YOU CHECKED OFF ANY PROBLEMS, HOW DIFFICULT HAVE THESE PROBLEMS MADE IT FOR YOU TO DO YOUR WORK, TAKE CARE OF THINGS AT HOME, OR GET ALONG WITH OTHER PEOPLE: 0
6. FEELING BAD ABOUT YOURSELF - OR THAT YOU ARE A FAILURE OR HAVE LET YOURSELF OR YOUR FAMILY DOWN: 0
7. TROUBLE CONCENTRATING ON THINGS, SUCH AS READING THE NEWSPAPER OR WATCHING TELEVISION: 0
1. LITTLE INTEREST OR PLEASURE IN DOING THINGS: 0
2. FEELING DOWN, DEPRESSED OR HOPELESS: 0
SUM OF ALL RESPONSES TO PHQ QUESTIONS 1-9: 0
SUM OF ALL RESPONSES TO PHQ QUESTIONS 1-9: 0
8. MOVING OR SPEAKING SO SLOWLY THAT OTHER PEOPLE COULD HAVE NOTICED. OR THE OPPOSITE, BEING SO FIGETY OR RESTLESS THAT YOU HAVE BEEN MOVING AROUND A LOT MORE THAN USUAL: 0
SUM OF ALL RESPONSES TO PHQ QUESTIONS 1-9: 0
9. THOUGHTS THAT YOU WOULD BE BETTER OFF DEAD, OR OF HURTING YOURSELF: 0
4. FEELING TIRED OR HAVING LITTLE ENERGY: 0
SUM OF ALL RESPONSES TO PHQ9 QUESTIONS 1 & 2: 0
5. POOR APPETITE OR OVEREATING: 0

## 2022-09-01 ASSESSMENT — ENCOUNTER SYMPTOMS
BLOOD IN STOOL: 0
ABDOMINAL PAIN: 0
SHORTNESS OF BREATH: 0
PHOTOPHOBIA: 0
ABDOMINAL DISTENTION: 0
EYE PAIN: 0
COLOR CHANGE: 0
NAUSEA: 0
SORE THROAT: 0
COUGH: 0

## 2022-09-02 NOTE — PROGRESS NOTES
Elieser Swenson  1985 is a 30 y.o. male ,Established patient, here for evaluation of the following chief complaint(s):   Chief Complaint   Patient presents with    Follow-up    Medication Refill    Cholesterol Problem     Pt is not fasting          1. Bipolar depression ([address]  -     ALPRAZolam (XANAX) 2 MG tablet; Take 1 tablet by mouth 3 times daily as needed for Anxiety for up to 90 days. , Disp-90 tablet, R-2Normal  2. Anxiety--do not advise increasing the xanax---DO ADVISE CUTTING BACK WORK IF THIS IS CAUSING STRESS. -     ALPRAZolam (XANAX) 2 MG tablet; Take 1 tablet by mouth 3 times daily as needed for Anxiety for up to 90 days. , Disp-90 tablet, R-2Normal  3. Other insomnia  -     zolpidem (AMBIEN) 10 MG tablet; Take 1 tablet by mouth at bedtime for 90 days. , Disp-30 tablet, R-2Normal    ELEVATED BP---make sure that he has follow up to have bp checked / nursing visit. Return in about 3 months (around 11/30/2022). Subjective   SUBJECTIVE/OBJECTIVE:  ANXIETY --chronic ongoing---he would like to increase xanax due to stress at work. INSOMNIA--chronic ongoing improving      Review of Systems   Constitutional:  Negative for chills and fever. HENT:  Negative for ear pain, hearing loss and sore throat. Eyes:  Negative for photophobia and pain. Respiratory:  Negative for cough and shortness of breath. Cardiovascular:  Negative for chest pain, palpitations and leg swelling. Gastrointestinal:  Negative for abdominal distention, abdominal pain, blood in stool and nausea. Genitourinary:  Negative for dysuria and urgency. Musculoskeletal:  Negative for joint swelling and myalgias. Skin:  Negative for color change, pallor and rash. Neurological:  Negative for speech difficulty, weakness, light-headedness and headaches. Hematological:  Negative for adenopathy. Psychiatric/Behavioral:  Negative for agitation, confusion, hallucinations, self-injury and suicidal ideas. Objective   Physical Exam  Constitutional:       Appearance: Normal appearance. HENT:      Head: Normocephalic and atraumatic. Nose: Nose normal.   Eyes:      Extraocular Movements: Extraocular movements intact. Conjunctiva/sclera: Conjunctivae normal.      Pupils: Pupils are equal, round, and reactive to light. Cardiovascular:      Rate and Rhythm: Normal rate and regular rhythm. Pulses: Normal pulses. Heart sounds: Normal heart sounds. Pulmonary:      Effort: Pulmonary effort is normal.      Breath sounds: Normal breath sounds. Abdominal:      General: Abdomen is flat. Bowel sounds are normal.      Palpations: Abdomen is soft. Skin:     General: Skin is warm and dry. Neurological:      General: No focal deficit present. Mental Status: He is alert and oriented to person, place, and time. Psychiatric:         Mood and Affect: Mood normal.                 An electronic signature was used to authenticate this note.     --Gomez Greenfield MD

## 2022-09-22 ENCOUNTER — HOSPITAL ENCOUNTER (INPATIENT)
Age: 37
LOS: 9 days | Discharge: PSYCHIATRIC OR SUBSTANCE USE FACILITY | DRG: 918 | End: 2022-10-02
Attending: EMERGENCY MEDICINE | Admitting: INTERNAL MEDICINE

## 2022-09-22 DIAGNOSIS — T39.1X1A ACCIDENTAL ACETAMINOPHEN OVERDOSE, INITIAL ENCOUNTER: ICD-10-CM

## 2022-09-22 DIAGNOSIS — F31.9 BIPOLAR 1 DISORDER (HCC): Primary | ICD-10-CM

## 2022-09-22 DIAGNOSIS — F19.90 SUBSTANCE USE DISORDER: ICD-10-CM

## 2022-09-22 DIAGNOSIS — F20.9 SCHIZOPHRENIA, UNSPECIFIED TYPE (HCC): ICD-10-CM

## 2022-09-22 LAB
BASOPHILS # BLD: 0.1 K/UL (ref 0–0.2)
BASOPHILS NFR BLD: 1 % (ref 0–2)
DIFFERENTIAL METHOD BLD: ABNORMAL
EOSINOPHIL # BLD: 0.2 K/UL (ref 0–0.8)
EOSINOPHIL NFR BLD: 6 % (ref 0.5–7.8)
ERYTHROCYTE [DISTWIDTH] IN BLOOD BY AUTOMATED COUNT: 13.9 % (ref 11.9–14.6)
HCT VFR BLD AUTO: 43.1 % (ref 41.1–50.3)
HGB BLD-MCNC: 14.1 G/DL (ref 13.6–17.2)
IMM GRANULOCYTES # BLD AUTO: 0.1 K/UL (ref 0–0.5)
IMM GRANULOCYTES NFR BLD AUTO: 2 % (ref 0–5)
LYMPHOCYTES # BLD: 0.8 K/UL (ref 0.5–4.6)
LYMPHOCYTES NFR BLD: 19 % (ref 13–44)
MCH RBC QN AUTO: 29 PG (ref 26.1–32.9)
MCHC RBC AUTO-ENTMCNC: 32.7 G/DL (ref 31.4–35)
MCV RBC AUTO: 88.5 FL (ref 79.6–97.8)
MONOCYTES # BLD: 0.4 K/UL (ref 0.1–1.3)
MONOCYTES NFR BLD: 11 % (ref 4–12)
NEUTS SEG # BLD: 2.5 K/UL (ref 1.7–8.2)
NEUTS SEG NFR BLD: 61 % (ref 43–78)
NRBC # BLD: 0 K/UL (ref 0–0.2)
PLATELET # BLD AUTO: 196 K/UL (ref 150–450)
PMV BLD AUTO: 11.1 FL (ref 9.4–12.3)
RBC # BLD AUTO: 4.87 M/UL (ref 4.23–5.6)
WBC # BLD AUTO: 4.1 K/UL (ref 4.3–11.1)

## 2022-09-22 PROCEDURE — 80179 DRUG ASSAY SALICYLATE: CPT

## 2022-09-22 PROCEDURE — 80053 COMPREHEN METABOLIC PANEL: CPT

## 2022-09-22 PROCEDURE — 93005 ELECTROCARDIOGRAM TRACING: CPT | Performed by: EMERGENCY MEDICINE

## 2022-09-22 PROCEDURE — 82077 ASSAY SPEC XCP UR&BREATH IA: CPT

## 2022-09-22 PROCEDURE — 80143 DRUG ASSAY ACETAMINOPHEN: CPT

## 2022-09-22 PROCEDURE — 85025 COMPLETE CBC W/AUTO DIFF WBC: CPT

## 2022-09-22 PROCEDURE — 80307 DRUG TEST PRSMV CHEM ANLYZR: CPT

## 2022-09-22 PROCEDURE — 83735 ASSAY OF MAGNESIUM: CPT

## 2022-09-22 PROCEDURE — 99285 EMERGENCY DEPT VISIT HI MDM: CPT

## 2022-09-22 ASSESSMENT — PATIENT HEALTH QUESTIONNAIRE - PHQ9: SUM OF ALL RESPONSES TO PHQ QUESTIONS 1-9: 18

## 2022-09-23 PROBLEM — T14.91XA SUICIDE ATTEMPT (HCC): Status: ACTIVE | Noted: 2019-02-25

## 2022-09-23 PROBLEM — T39.1X1A TYLENOL TOXICITY, ACCIDENTAL OR UNINTENTIONAL, INITIAL ENCOUNTER: Status: ACTIVE | Noted: 2022-09-23

## 2022-09-23 PROBLEM — F19.90 SUBSTANCE USE DISORDER: Status: ACTIVE | Noted: 2019-02-25

## 2022-09-23 PROBLEM — F31.5 BIPOLAR I DISORDER, MOST RECENT EPISODE DEPRESSED, SEVERE W PSYCHOSIS (HCC): Status: ACTIVE | Noted: 2019-03-11

## 2022-09-23 PROBLEM — R45.851 SUICIDE IDEATION: Status: ACTIVE | Noted: 2022-09-23

## 2022-09-23 LAB
ALBUMIN SERPL-MCNC: 2.8 G/DL (ref 3.5–5)
ALBUMIN SERPL-MCNC: 3.2 G/DL (ref 3.5–5)
ALBUMIN/GLOB SERPL: 0.7 {RATIO} (ref 1.2–3.5)
ALBUMIN/GLOB SERPL: 0.9 {RATIO} (ref 1.2–3.5)
ALP SERPL-CCNC: 66 U/L (ref 50–136)
ALP SERPL-CCNC: 88 U/L (ref 50–136)
ALT SERPL-CCNC: 1761 U/L (ref 12–65)
ALT SERPL-CCNC: 2235 U/L (ref 12–65)
AMPHET UR QL SCN: NEGATIVE
ANION GAP SERPL CALC-SCNC: 5 MMOL/L (ref 4–13)
ANION GAP SERPL CALC-SCNC: 8 MMOL/L (ref 4–13)
APAP SERPL-MCNC: 72 UG/ML (ref 10–30)
AST SERPL-CCNC: 1677 U/L (ref 15–37)
AST SERPL-CCNC: 846 U/L (ref 15–37)
BARBITURATES UR QL SCN: NEGATIVE
BENZODIAZ UR QL: NEGATIVE
BILIRUB SERPL-MCNC: 2.3 MG/DL (ref 0.2–1.1)
BILIRUB SERPL-MCNC: 2.4 MG/DL (ref 0.2–1.1)
BUN SERPL-MCNC: 10 MG/DL (ref 6–23)
BUN SERPL-MCNC: 8 MG/DL (ref 6–23)
CALCIUM SERPL-MCNC: 8.3 MG/DL (ref 8.3–10.4)
CALCIUM SERPL-MCNC: 8.6 MG/DL (ref 8.3–10.4)
CANNABINOIDS UR QL SCN: NEGATIVE
CHLORIDE SERPL-SCNC: 100 MMOL/L (ref 101–110)
CHLORIDE SERPL-SCNC: 102 MMOL/L (ref 101–110)
CO2 SERPL-SCNC: 24 MMOL/L (ref 21–32)
CO2 SERPL-SCNC: 26 MMOL/L (ref 21–32)
COCAINE UR QL SCN: POSITIVE
CREAT SERPL-MCNC: 0.7 MG/DL (ref 0.8–1.5)
CREAT SERPL-MCNC: 1 MG/DL (ref 0.8–1.5)
EKG ATRIAL RATE: 100 BPM
EKG DIAGNOSIS: NORMAL
EKG P AXIS: 68 DEGREES
EKG P-R INTERVAL: 128 MS
EKG Q-T INTERVAL: 350 MS
EKG QRS DURATION: 90 MS
EKG QTC CALCULATION (BAZETT): 451 MS
EKG R AXIS: 88 DEGREES
EKG T AXIS: -76 DEGREES
EKG VENTRICULAR RATE: 100 BPM
ETHANOL SERPL-MCNC: <3 MG/DL (ref 0–0.08)
GLOBULIN SER CALC-MCNC: 3.7 G/DL (ref 2.3–3.5)
GLOBULIN SER CALC-MCNC: 3.8 G/DL (ref 2.3–3.5)
GLUCOSE SERPL-MCNC: 113 MG/DL (ref 65–100)
GLUCOSE SERPL-MCNC: 115 MG/DL (ref 65–100)
MAGNESIUM SERPL-MCNC: 2.4 MG/DL (ref 1.8–2.4)
METHADONE UR QL: NEGATIVE
OPIATES UR QL: POSITIVE
PCP UR QL: NEGATIVE
POTASSIUM SERPL-SCNC: 3.5 MMOL/L (ref 3.5–5.1)
POTASSIUM SERPL-SCNC: 3.9 MMOL/L (ref 3.5–5.1)
PROT SERPL-MCNC: 6.6 G/DL (ref 6.3–8.2)
PROT SERPL-MCNC: 6.9 G/DL (ref 6.3–8.2)
SALICYLATES SERPL-MCNC: 21.7 MG/DL (ref 2.8–20)
SODIUM SERPL-SCNC: 131 MMOL/L (ref 136–145)
SODIUM SERPL-SCNC: 134 MMOL/L (ref 138–145)

## 2022-09-23 PROCEDURE — 96375 TX/PRO/DX INJ NEW DRUG ADDON: CPT

## 2022-09-23 PROCEDURE — 80053 COMPREHEN METABOLIC PANEL: CPT

## 2022-09-23 PROCEDURE — 36415 COLL VENOUS BLD VENIPUNCTURE: CPT

## 2022-09-23 PROCEDURE — 6370000000 HC RX 637 (ALT 250 FOR IP): Performed by: INTERNAL MEDICINE

## 2022-09-23 PROCEDURE — 6360000002 HC RX W HCPCS: Performed by: INTERNAL MEDICINE

## 2022-09-23 PROCEDURE — 1100000000 HC RM PRIVATE

## 2022-09-23 PROCEDURE — 6360000002 HC RX W HCPCS: Performed by: STUDENT IN AN ORGANIZED HEALTH CARE EDUCATION/TRAINING PROGRAM

## 2022-09-23 PROCEDURE — 96374 THER/PROPH/DIAG INJ IV PUSH: CPT

## 2022-09-23 PROCEDURE — 2580000003 HC RX 258: Performed by: INTERNAL MEDICINE

## 2022-09-23 PROCEDURE — 2580000003 HC RX 258: Performed by: STUDENT IN AN ORGANIZED HEALTH CARE EDUCATION/TRAINING PROGRAM

## 2022-09-23 RX ORDER — ENOXAPARIN SODIUM 100 MG/ML
40 INJECTION SUBCUTANEOUS DAILY
Status: DISCONTINUED | OUTPATIENT
Start: 2022-09-23 | End: 2022-10-03 | Stop reason: HOSPADM

## 2022-09-23 RX ORDER — ONDANSETRON 2 MG/ML
4 INJECTION INTRAMUSCULAR; INTRAVENOUS EVERY 6 HOURS PRN
Status: DISCONTINUED | OUTPATIENT
Start: 2022-09-23 | End: 2022-10-03 | Stop reason: HOSPADM

## 2022-09-23 RX ORDER — IBUPROFEN 200 MG
400 TABLET ORAL EVERY 6 HOURS PRN
Status: DISCONTINUED | OUTPATIENT
Start: 2022-09-23 | End: 2022-10-01

## 2022-09-23 RX ORDER — OXYCODONE HYDROCHLORIDE 5 MG/1
5 TABLET ORAL EVERY 4 HOURS PRN
Status: DISCONTINUED | OUTPATIENT
Start: 2022-09-23 | End: 2022-10-03 | Stop reason: HOSPADM

## 2022-09-23 RX ORDER — ALPRAZOLAM 0.5 MG/1
2 TABLET ORAL 3 TIMES DAILY PRN
Status: DISCONTINUED | OUTPATIENT
Start: 2022-09-23 | End: 2022-09-29 | Stop reason: SDUPTHER

## 2022-09-23 RX ORDER — NICOTINE 21 MG/24HR
1 PATCH, TRANSDERMAL 24 HOURS TRANSDERMAL DAILY
Status: DISCONTINUED | OUTPATIENT
Start: 2022-09-23 | End: 2022-10-03 | Stop reason: HOSPADM

## 2022-09-23 RX ORDER — SODIUM CHLORIDE 9 MG/ML
INJECTION, SOLUTION INTRAVENOUS PRN
Status: DISCONTINUED | OUTPATIENT
Start: 2022-09-23 | End: 2022-10-03 | Stop reason: HOSPADM

## 2022-09-23 RX ORDER — SODIUM CHLORIDE 0.9 % (FLUSH) 0.9 %
5-40 SYRINGE (ML) INJECTION EVERY 12 HOURS SCHEDULED
Status: DISCONTINUED | OUTPATIENT
Start: 2022-09-23 | End: 2022-10-03 | Stop reason: HOSPADM

## 2022-09-23 RX ORDER — SODIUM CHLORIDE 0.9 % (FLUSH) 0.9 %
5-40 SYRINGE (ML) INJECTION PRN
Status: DISCONTINUED | OUTPATIENT
Start: 2022-09-23 | End: 2022-10-03 | Stop reason: HOSPADM

## 2022-09-23 RX ORDER — ONDANSETRON 2 MG/ML
8 INJECTION INTRAMUSCULAR; INTRAVENOUS
Status: COMPLETED | OUTPATIENT
Start: 2022-09-23 | End: 2022-09-23

## 2022-09-23 RX ORDER — POLYETHYLENE GLYCOL 3350 17 G/17G
17 POWDER, FOR SOLUTION ORAL DAILY PRN
Status: DISCONTINUED | OUTPATIENT
Start: 2022-09-23 | End: 2022-10-03 | Stop reason: HOSPADM

## 2022-09-23 RX ORDER — ONDANSETRON 4 MG/1
4 TABLET, ORALLY DISINTEGRATING ORAL EVERY 8 HOURS PRN
Status: DISCONTINUED | OUTPATIENT
Start: 2022-09-23 | End: 2022-10-03 | Stop reason: HOSPADM

## 2022-09-23 RX ADMIN — ALPRAZOLAM 2 MG: 0.5 TABLET ORAL at 10:01

## 2022-09-23 RX ADMIN — ONDANSETRON 8 MG: 2 INJECTION INTRAMUSCULAR; INTRAVENOUS at 06:15

## 2022-09-23 RX ADMIN — ACETYLCYSTEINE 14980 MG: 200 INJECTION, SOLUTION INTRAVENOUS at 05:24

## 2022-09-23 RX ADMIN — ALPRAZOLAM 2 MG: 0.5 TABLET ORAL at 23:48

## 2022-09-23 RX ADMIN — ACETYLCYSTEINE 5000 MG: 200 INJECTION, SOLUTION INTRAVENOUS at 06:39

## 2022-09-23 RX ADMIN — ENOXAPARIN SODIUM 40 MG: 100 INJECTION SUBCUTANEOUS at 08:53

## 2022-09-23 RX ADMIN — SODIUM CHLORIDE, PRESERVATIVE FREE 10 ML: 5 INJECTION INTRAVENOUS at 20:25

## 2022-09-23 RX ADMIN — ACETYLCYSTEINE 9980 MG: 200 INJECTION, SOLUTION INTRAVENOUS at 10:53

## 2022-09-23 ASSESSMENT — ENCOUNTER SYMPTOMS
VOMITING: 0
DIARRHEA: 0

## 2022-09-23 ASSESSMENT — PAIN DESCRIPTION - LOCATION
LOCATION: FOOT
LOCATION: FOOT

## 2022-09-23 ASSESSMENT — PAIN SCALES - GENERAL
PAINLEVEL_OUTOF10: 7
PAINLEVEL_OUTOF10: 7

## 2022-09-23 NOTE — PROGRESS NOTES
TRANSFER - IN REPORT:    Verbal report received from Idalmis palumbo RN on Sandra Elias  being received from ED for routine progression of patient care      Report consisted of patient's Situation, Background, Assessment and   Recommendations(SBAR). Information from the following report(s) Nurse Handoff Report was reviewed with the receiving nurse. Opportunity for questions and clarification was provided. Assessment completed upon patient's arrival to unit and care assumed.

## 2022-09-23 NOTE — ED NOTES
TRANSFER - OUT REPORT:    Verbal report given to Yesenia Barajas 71 on Martha Alvarez  being transferred to [de-identified] 5 old ED for routine progression of patient care       Report consisted of patient's Situation, Background, Assessment and   Recommendations(SBAR). Information from the following report(s) ED SBAR, MAR, and Recent Results was reviewed with the receiving nurse. Lines:   Peripheral IV 09/23/22 Dorsal;Left Hand (Active)        Opportunity for questions and clarification was provided.       Patient transported with:  Transport and Quincy [place] RN  09/23/22 0648

## 2022-09-23 NOTE — ED NOTES
Telepsych provider called to speak to this RN regarding SBAR for pt. Telepsych to speak with pt at this time.      [address], RN  09/23/22 4879

## 2022-09-23 NOTE — ED TRIAGE NOTES
Pt arrives from home stating SI. Pt states his plan is to slit his wrist. Pt has a hx of depression and anxiety. Pt denies chest pain. Pt denies SOB. Pt denies HI. Pt states he takes psych meds and states he does not feel like it is helping.  Pt denies ETOH and illegal street drugs    VS with EMS  /81    RR 16  99RA

## 2022-09-23 NOTE — CONSULTS
Arranged consult with [address] [facility] via web site.  Consult ID: [id number]

## 2022-09-23 NOTE — PROGRESS NOTES
TRANSFER - IN REPORT:    Verbal report received from Mchenry on Yordy Edwards  being received from ED for routine progression of patient care      Report consisted of patient's Situation, Background, Assessment and   Recommendations(SBAR). Information from the following report(s) Nurse Handoff Report, Intake/Output, and MAR was reviewed with the receiving nurse. Opportunity for questions and clarification was provided. Assessment completed upon patient's arrival to unit and care assumed.

## 2022-09-23 NOTE — PROGRESS NOTES
TRANSFER - OUT REPORT:    Verbal report given to ALBERT Staples on Ramesh Webb  being transferred to 6th Floor for routine progression of patient care       Report consisted of patient's Situation, Background, Assessment and   Recommendations(SBAR). Information from the following report(s) Nurse Handoff Report, Index, ED SBAR, Adult Overview, Surgery Report, Intake/Output, and MAR was reviewed with the receiving nurse. Lines:   Peripheral IV 09/23/22 Dorsal;Left Hand (Active)   Site Assessment Clean, dry & intact 09/23/22 0800   Line Status Infusing 09/23/22 0800   Line Care Connections checked and tightened 09/23/22 0800   Phlebitis Assessment No symptoms 09/23/22 0800   Infiltration Assessment 0 09/23/22 0800   Alcohol Cap Used No 09/23/22 0800   Dressing Status Clean, dry & intact 09/23/22 0800   Dressing Type Transparent 09/23/22 0800        Opportunity for questions and clarification was provided.       Patient transported with:  [employer]

## 2022-09-23 NOTE — CARE COORDINATION
RNCM met with [facility] RNCM  to review commitment paperwork that MD stated was completed. Written MD notes incomplete MD notified. MD stated will complete. Nursing supervisor notified to notarize paper work.

## 2022-09-23 NOTE — ED PROVIDER NOTES
[facility] Provider Note                   PCP:                Yazan Cabrera MD               Age: 30 y.o. Sex: male       ICD-10-CM    1. Bipolar 1 disorder ([facility])  F31.9       2. Schizophrenia, unspecified type ([facility]  F20.9           DISPOSITION         New Prescriptions    No medications on file       Orders Placed This Encounter   Procedures    CBC with Auto Differential    CMP    Salicylate    Acetaminophen Level    ETOH    Magnesium    Urine Drug Screen    Complete CSSRS Screen    Complete SBIRT Screen    EKG 12 Lead         Medardo Sun is a 30 y.o. male who presents to the Emergency Department with chief complaint of    Chief Complaint   Patient presents with    Suicidal      Patient is a 79-year-old male with a history of bipolar disorder and schizophrenia currently on medications who presents with suicidal ideation. He states he has not slept in the past 2 days. He has been having thoughts of killing himself. He has tried to commit suicide in the past by walking into traffic, was sent to [facility]. He states he has been thinking about cutting his wrists and in fact had a knife up to his wrist earlier. Review of Systems   Constitutional:  Negative for chills and fever. Gastrointestinal:  Negative for diarrhea and vomiting. All other systems reviewed and are negative. All other systems reviewed and are negative. Past Medical History:   Diagnosis Date    Asthma     Ill-defined condition     bipolar, depression    Psychiatric disorder         No past surgical history on file. No family history on file. Social Connections: Not on file        Allergies   Allergen Reactions    Aspirin Hives        Vitals signs and nursing note reviewed. Patient Vitals for the past 4 hrs:   Temp Pulse Resp BP SpO2   09/22/22 2245 98.2 °F (36.8 °C) 97 18 114/88 100 %          Physical Exam  Vitals and nursing note reviewed.    Constitutional: General: He is not in acute distress. Appearance: Normal appearance. HENT:      Head: Normocephalic and atraumatic. Eyes:      General:         Right eye: No discharge. Left eye: No discharge. Conjunctiva/sclera: Conjunctivae normal.   Cardiovascular:      Rate and Rhythm: Normal rate and regular rhythm. Pulmonary:      Effort: Pulmonary effort is normal. No respiratory distress. Abdominal:      General: There is no distension. Palpations: Abdomen is soft. Tenderness: There is no abdominal tenderness. Musculoskeletal:      Right lower leg: No edema. Left lower leg: No edema. Skin:     General: Skin is warm. Neurological:      Mental Status: He is alert.    Psychiatric:         Mood and Affect: Mood normal.         Behavior: Behavior normal.        MDM  Number of Diagnoses or Management Options  Bipolar 1 disorder ([facility]): established, worsening  Schizophrenia, unspecified type ([facility]): established, worsening     Amount and/or Complexity of Data Reviewed  Clinical lab tests: ordered and reviewed    Risk of Complications, Morbidity, and/or Mortality  Presenting problems: moderate  Diagnostic procedures: moderate  Management options: moderate    Patient Progress  Patient progress: stable      Procedures    Labs Reviewed   CBC WITH AUTO DIFFERENTIAL - Abnormal; Notable for the following components:       Result Value    WBC 4.1 (*)     All other components within normal limits   COMPREHENSIVE METABOLIC PANEL - Abnormal; Notable for the following components:    Sodium 131 (*)     Glucose 113 (*)     Total Bilirubin 2.3 (*)     ALT 2,235 (*)     AST 1,677 (*)     Albumin 3.2 (*)     Globulin 3.7 (*)     Albumin/Globulin Ratio 0.9 (*)     All other components within normal limits   SALICYLATE LEVEL - Abnormal; Notable for the following components:    Salicylate, Serum 60.2 (*)     All other components within normal limits   ACETAMINOPHEN LEVEL - Abnormal; Notable for the following components:    Acetaminophen Level 72 (*)     All other components within normal limits   URINE DRUG SCREEN - Abnormal; Notable for the following components:    Cocaine, Urine Positive (*)     Opiates, Urine Positive (*)     All other components within normal limits   ETHANOL   MAGNESIUM        No orders to display            Kim Coma Scale  Eye Opening: Spontaneous  Best Verbal Response: Oriented  Best Motor Response: Obeys commands  [place] Coma Scale Score: 15               ED Course as of 09/23/22 0058   Fri Sep 23, 2022   0057 Plan is to have case management see patient in the morning and a telepsych evaluation. [DC]      ED Course User Index  [DC] Pavan Deleon MD        Voice dictation software was used during the making of this note. This software is not perfect and grammatical and other typographical errors may be present. This note has not been completely proofread for errors.         Pavan Deleon MD  10/11/22 6232

## 2022-09-23 NOTE — ED NOTES
Patient resting at this time. No signs or symptoms of distress. Respirations even and unlabored. Sitter at bedside. Safety precautions in place.        Mervat Cortez RN  09/23/22 0144

## 2022-09-23 NOTE — ED NOTES
Pt actively vomiting at this time. Verbal order was obtained by ED MD for 8mg zofran IV.       Rae Roe RN  09/23/22 7301

## 2022-09-23 NOTE — CARE COORDINATION
Telepsych report placed on pts chart. Dr Nneka French made aware of pt needing involuntary commitment papers completed, papers started and placed on chart in old ED overflow. Burke Roper also aware of papers on chart and needed to be completed.

## 2022-09-23 NOTE — CARE COORDINATION
Patient is on Involuntary Committment Papers        Case Management Assessment  Initial Evaluation    Date/Time of Evaluation: 9/23/2022 4:13 PM  Assessment Completed by: Veronica Stallworth RN    If patient is discharged prior to next notation, then this note serves as note for discharge by case management. Patient Name: Dom Harvey                   YOB: 1985  Diagnosis: Suicide ideation [R45.851]  Bipolar 1 disorder ([facility] [F31.9]  Accidental acetaminophen overdose, initial encounter [T39.1X1A]  Schizophrenia, unspecified type ([facility] [F20.9]                   Date / Time: 9/22/2022 10:54 PM    Patient Admission Status: Inpatient     Current PCP: Peg Telles MD  PCP verified by CM? Chart Reviewed: Yes      History Provided by: (P) Medical Record  Patient Orientation: (P) Unable to Assess    Patient Cognition:      Hospitalization in the last 30 days (Readmission):  No    If yes, Readmission Assessment in CM Navigator will be completed. Advance Directives:     Code Status: Full Code       Discharge Planning  Patient lives with: Friends Type of Home: House  Primary Care Giver:    Patient Support Systems include: Family Members   Current Financial resources:    Current community resources:    Current services prior to admission: None   Type of Home Care services:  None    ADLS  Prior functional level: (P) Independent in ADLs/IADLs  Current functional level: (P) Independent in ADLs/IADLs    PT AM-PAC:   /24  OT AM-PAC:   /24    Family can provide assistance at DC: Would you like Case Management to discuss the discharge plan with any other family members/significant others, and if so, who?     Plans to Return to Present Housing: (P) Unknown at present  Other Identified Issues/Barriers to RETURNING to current housing: Patient is not mentally stable to return back  to his home per Tele- Psych eval and patient now has involuntary commitment paperwork placed  Potential Assistance needed at discharge: Mental marilynn services  Patient expects to discharge to: [address] [place] [address] for transportation at discharge:      Financial  Payor: / Self -Pay    Does insurance require precert for SNF: No    Potential assistance Purchasing Medications:    Meds-to-Beds request:        [id number] Joesph Arnold [phone]  [address] [place] [address] [address] [place] [address],First Floor  Phone: [phone] Fax: [phone]      Barriers to discharge: Limited family support, No caregiver support, Impulsivity, Limited safety awareness, Decreased motivation, Depression, and Anxiety    Additional Case Management Notes: Referral have been faxed to [facility] and [address] [facility] for involuntary committment ( pending)    The Plan for Transition of Care is related to the following treatment goals of Suicide ideation [R45.851]  Bipolar 1 disorder ([facility] [F31.9]  Accidental acetaminophen overdose, initial encounter [T39.1X1A]  Schizophrenia, unspecified type ([facility] [I22.7]    IF APPLICABLE: The Patient and/or patient representative Yunior Aguilar and his family were provided with a choice of provider and agrees with the discharge plan. Freedom of choice list with basic dialogue that supports the patient's individualized plan of care/goals and shares the quality data associated with the providers was provided to:     Patient Representative Name:       The Patient and/or Patient Representative Agree with the Discharge Plan?       Adrianne Morataya RN  Case Management Department  Ph: [phone]

## 2022-09-23 NOTE — PROGRESS NOTES
TRANSFER - IN REPORT:    Verbal report received from [facility]  on Loli Bijou  being received from ED for routine progression of patient care      Report consisted of patient's Situation, Background, Assessment and   Recommendations(SBAR). Information from the following report(s) Nurse Handoff Report was reviewed with the receiving nurse. Opportunity for questions and clarification was provided. Assessment completed upon patient's arrival to unit and care assumed.

## 2022-09-23 NOTE — H&P
Hospitalist History and Physical   Admit Date:  2022 10:54 PM   Name:  Loli Mckee   Age:  30 y.o. Sex:  male  :  1985   MRN:  [id number]   Room:  ER10/10    Presenting Complaint: Suicidal     Reason(s) for Admission: No admission diagnoses are documented for this encounter. History of Present Illness:   Loli Mckee is a 30 y.o. male with medical history of polysubstance abuse, anxiety and Bipolar disorder who presented with suicide ideation. Patient presents to the ER with statement that he plans on slitting his wrists. He denies Homicidal ideation. He also denies fever, chest pain and sob. In the ER his LFTs were elevated along with a Tylenol level. He reports taking a \"couple\" of Acetaminophen tabs. Review of Systems:  10 systems reviewed and negative except as noted in HPI. Assessment & Plan: Active Problems:    Tylenol toxicity, accidental or unintentional, initial encounter  Plan: Will continue N- Acytlecystine treatment. Will monitor LFTs and tylenol level. Substance use disorder  Plan: cessation counseling. UDS +ve for cocaine and opiates. ETOH level negative. Will rx nicoderm    Suicide attempt [facility])  Plan: Telepsych consult. Patient expressed desire to return to [facility]    Bipolar I disorder, most recent episode depressed, severe w psychosis ([facility]  Plan: Patient doesn't believe his [employer] is working. Will have prn geodon   Anxiety  Plan: prn Xanax      Anticipated discharge needs:     None    Diet: No diet orders on file  VTE ppx: lovenox  Code status: No Order    Hospital Problems:  Active Problems:    Tylenol toxicity, accidental or unintentional, initial encounter    Substance use disorder    Suicide attempt ([facility]    Bipolar I disorder, most recent episode depressed, severe w psychosis ([facility]  Resolved Problems:    * No resolved hospital problems.  *       Past History:     Past Medical History:   Diagnosis Date    Asthma Ill-defined condition     bipolar, depression    Psychiatric disorder        No past surgical history on file. Social History     Tobacco Use    Smoking status: Every Day     Packs/day: 0.50     Years: 17.00     Pack years: 8.50     Types: Cigarettes    Smokeless tobacco: Never   Substance Use Topics    Alcohol use: Yes      Social History     Substance and Sexual Activity   Drug Use No       No family history on file. Immunization History   Administered Date(s) Administered    Influenza Virus Vaccine 10/24/2018     Allergies   Allergen Reactions    Aspirin Hives     Prior to Admit Medications:  Current Outpatient Medications   Medication Instructions    ALPRAZolam (XANAX) 2 mg, Oral, 3 TIMES DAILY PRN    HYDROcodone-acetaminophen (NORCO)  MG per tablet 1 tablet, Oral, EVERY 8 HOURS PRN    paliperidone palmitate ER (INVEGA SUSTENNA) 234 mg, IntraMUSCular, EVERY 30 DAYS    zolpidem (AMBIEN) 10 mg, Oral, Nightly         Objective:   Patient Vitals for the past 24 hrs:   Temp Pulse Resp BP SpO2   09/22/22 2245 98.2 °F (36.8 °C) 97 18 114/88 100 %       Oxygen Therapy  SpO2: 100 %  O2 Device: None (Room air)    Estimated body mass index is 28.25 kg/m² as calculated from the following:    Height as of 8/31/22: 6' 2\" (1.88 m). Weight as of this encounter: 220 lb (99.8 kg). No intake or output data in the 24 hours ending 09/23/22 0604      Physical Exam:    Blood pressure 114/88, pulse 97, temperature 98.2 °F (36.8 °C), temperature source Oral, resp. rate 18, weight 220 lb (99.8 kg), SpO2 100 %. General:    Well nourished. Head:  Normocephalic, atraumatic  Eyes:  Sclerae appear normal.  Pupils equally round. ENT:  Nares appear normal, no drainage. Moist oral mucosa  Neck:  No restricted ROM. Trachea midline   CV:   RRR. No m/r/g. No jugular venous distension. Lungs:   Bilat coarse  Abdomen: Bowel sounds present. Soft, nontender, nondistended. Extremities: No cyanosis or clubbing.   No edema  Skin:     No rashes and normal coloration. Warm and dry. Neuro:  CN II-XII grossly intact. Sensation intact.   A&Ox3  Psych:  Flat affect    I have personally reviewed labs and tests showing:  Recent Labs:  Recent Results (from the past 24 hour(s))   EKG 12 Lead    Collection Time: 09/22/22 10:48 PM   Result Value Ref Range    Ventricular Rate 100 BPM    Atrial Rate 100 BPM    P-R Interval 128 ms    QRS Duration 90 ms    Q-T Interval 350 ms    QTc Calculation (Bazett) 451 ms    P Axis 68 degrees    R Axis 88 degrees    T Axis -76 degrees    Diagnosis Normal sinus rhythm    CBC with Auto Differential    Collection Time: 09/22/22 10:52 PM   Result Value Ref Range    WBC 4.1 (L) 4.3 - 11.1 K/uL    RBC 4.87 4.23 - 5.6 M/uL    Hemoglobin 14.1 13.6 - 17.2 g/dL    Hematocrit 43.1 41.1 - 50.3 %    MCV 88.5 79.6 - 97.8 FL    MCH 29.0 26.1 - 32.9 PG    MCHC 32.7 31.4 - 35.0 g/dL    RDW 13.9 11.9 - 14.6 %    Platelets 763 463 - 235 K/uL    MPV 11.1 9.4 - 12.3 FL    nRBC 0.00 0.0 - 0.2 K/uL    Differential Type AUTOMATED      Seg Neutrophils 61 43 - 78 %    Lymphocytes 19 13 - 44 %    Monocytes 11 4.0 - 12.0 %    Eosinophils % 6 0.5 - 7.8 %    Basophils 1 0.0 - 2.0 %    Immature Granulocytes 2 0.0 - 5.0 %    Segs Absolute 2.5 1.7 - 8.2 K/UL    Absolute Lymph # 0.8 0.5 - 4.6 K/UL    Absolute Mono # 0.4 0.1 - 1.3 K/UL    Absolute Eos # 0.2 0.0 - 0.8 K/UL    Basophils Absolute 0.1 0.0 - 0.2 K/UL    Absolute Immature Granulocyte 0.1 0.0 - 0.5 K/UL   CMP    Collection Time: 09/22/22 10:52 PM   Result Value Ref Range    Sodium 131 (L) 136 - 145 mmol/L    Potassium 3.9 3.5 - 5.1 mmol/L    Chloride 102 101 - 110 mmol/L    CO2 24 21 - 32 mmol/L    Anion Gap 5 4 - 13 mmol/L    Glucose 113 (H) 65 - 100 mg/dL    BUN 10 6 - 23 MG/DL    Creatinine 1.00 0.8 - 1.5 MG/DL    GFR African American >60 >60 ml/min/1.73m2    GFR Non- >60 >60 ml/min/1.73m2    Calcium 8.3 8.3 - 10.4 MG/DL    Total Bilirubin 2.3 (H) 0.2 - 1.1 MG/DL    ALT 2,235 (H) 12 - 65 U/L    AST 1,677 (H) 15 - 37 U/L    Alk Phosphatase 88 50 - 136 U/L    Total Protein 6.9 6.3 - 8.2 g/dL    Albumin 3.2 (L) 3.5 - 5.0 g/dL    Globulin 3.7 (H) 2.3 - 3.5 g/dL    Albumin/Globulin Ratio 0.9 (L) 1.2 - 3.5     Salicylate    Collection Time: 09/22/22 10:52 PM   Result Value Ref Range    Salicylate, Serum 77.3 (H) 2.8 - 20.0 MG/DL   Acetaminophen Level    Collection Time: 09/22/22 10:52 PM   Result Value Ref Range    Acetaminophen Level 72 (HH) 10.0 - 30.0 ug/mL   ETOH    Collection Time: 09/22/22 10:52 PM   Result Value Ref Range    Ethanol Lvl <3 MG/DL   Magnesium    Collection Time: 09/22/22 10:52 PM   Result Value Ref Range    Magnesium 2.4 1.8 - 2.4 mg/dL   Urine Drug Screen    Collection Time: 09/22/22 11:32 PM   Result Value Ref Range    PCP, Urine Negative NEG      Benzodiazepines, Urine Negative NEG      Cocaine, Urine Positive (A) NEG      Amphetamine, Urine Negative NEG      Methadone, Urine Negative NEG      THC, TH-Cannabinol, Urine Negative NEG      Opiates, Urine Positive (A) NEG      Barbiturates, Urine Negative NEG         I have personally reviewed imaging studies showing:  No results found. Echocardiogram:  No results found for this or any previous visit. Orders Placed This Encounter   Medications    FOLLOWED BY Linked Order Group     acetylcysteine (ACETADOTE) 14,980 mg in dextrose 5 % 274.9 mL infusion     acetylcysteine (ACETADOTE) 5,000 mg in dextrose 5 % 500 mL infusion     acetylcysteine (ACETADOTE) 9,980 mg in dextrose 5 % 1,000 mL infusion    ondansetron (ZOFRAN) injection 8 mg         Signed:  Catherine Cline MD    Part of this note may have been written by using a voice dictation software. The note has been proof read but may still contain some grammatical/other typographical errors.

## 2022-09-23 NOTE — ED NOTES
For care at change of shift from outgoing provider. This is a 68-year-old male patient presenting with reports of suicidal ideation and plan to harm himself via cutting his wrist.  He reports a history of schizophrenia and bipolar disorder. He is medicated for these issues and consistent taking his medication. Consequently, elevation in patient's Tylenol level was noted on routine labs and CMP results show evidence of significant elevation in liver enzymes. On recheck, patient states that he has been taking Tylenol in order to sleep at night. He estimates approximately 5 Tylenol PM tablets taken each night for the past 2 nights. He also takes hydrocodone with acetaminophen in it as well for chronic discomfort. Patient denies attempted self-harm via Tylenol ingestion. He reports no other over-the-counter medication usage. Discussed [employer] protocol with pharmacist, this is been ordered and hospitalist consulted for admission given this finding. Awaiting psychiatric evaluation as well. Informed patient of this plan of care, voiced understanding agreement.      [address] [facility],   09/23/22 3651

## 2022-09-23 NOTE — ED NOTES
Constant Observer Yes - Name: Leann Tran PSA   Constant Observer Oriented yes   High risk patients are in line of sight at all times Yes   Excess equipment/medical supplies not necessary for the care of the patient removed Yes   All sharp or dangerous objects are removed from room: including but not limited to belts, pens & pencils, needles, medications, cosmetics, lighters, matches, nail files, watches, necklaces, glass objects, razors, razor blades, knives, aerosol sprays, drawstring pants, shoes, cords (telephone, call bells, etc.) cleaning wipes or other cleaning items, aluminum cans, not permanently attached wall décor Yes   Telephone/cell phone removed as well as TV remote (batteries can be swallowed) Yes   Patient belongings removed and labeled at nurses station Yes   Excess linen is removed from room Yes   All plastic bags are removed from the room and replaced with paper trash bags Yes   Patient is in paper scrubs or appropriate gown and using hospital socks with rubber soles Yes   No metal, hard eating utensils or hard plates are on meal tray Yes   Remove all cleaning agents used by [employer] Yes   If Crucifix is hanging on a nail, remove Crucifix as well as the nail Yes       *If any question above is answered \"No,\" documentation is required.         [address] [facility], RN  09/22/22 4382

## 2022-09-23 NOTE — ED NOTES
Verbal agreement was made with pt regarding SI/HI. Pt agrees to not harm himself during his treatment of elevated tylenol levels since ligature risks will be elevated due to the use of IV medications, IV pump cord, and VS monitor cords.       Cj Cortez RN  09/23/22 6049

## 2022-09-24 LAB
ALBUMIN SERPL-MCNC: 2.7 G/DL (ref 3.5–5)
ALBUMIN/GLOB SERPL: 0.7 {RATIO} (ref 1.2–3.5)
ALP SERPL-CCNC: 72 U/L (ref 50–136)
ALT SERPL-CCNC: 1191 U/L (ref 12–65)
ANION GAP SERPL CALC-SCNC: 4 MMOL/L (ref 4–13)
APAP SERPL-MCNC: <2 UG/ML (ref 10–30)
AST SERPL-CCNC: 252 U/L (ref 15–37)
BASOPHILS # BLD: 0.1 K/UL (ref 0–0.2)
BASOPHILS NFR BLD: 1 % (ref 0–2)
BILIRUB SERPL-MCNC: 2.8 MG/DL (ref 0.2–1.1)
BUN SERPL-MCNC: 13 MG/DL (ref 6–23)
CALCIUM SERPL-MCNC: 9.1 MG/DL (ref 8.3–10.4)
CHLORIDE SERPL-SCNC: 105 MMOL/L (ref 101–110)
CO2 SERPL-SCNC: 28 MMOL/L (ref 21–32)
CREAT SERPL-MCNC: 0.9 MG/DL (ref 0.8–1.5)
DIFFERENTIAL METHOD BLD: ABNORMAL
EOSINOPHIL # BLD: 0.6 K/UL (ref 0–0.8)
EOSINOPHIL NFR BLD: 10 % (ref 0.5–7.8)
ERYTHROCYTE [DISTWIDTH] IN BLOOD BY AUTOMATED COUNT: 13.7 % (ref 11.9–14.6)
GLOBULIN SER CALC-MCNC: 3.9 G/DL (ref 2.3–3.5)
GLUCOSE SERPL-MCNC: 103 MG/DL (ref 65–100)
HCT VFR BLD AUTO: 42.3 % (ref 41.1–50.3)
HGB BLD-MCNC: 13.4 G/DL (ref 13.6–17.2)
IMM GRANULOCYTES # BLD AUTO: 0.1 K/UL (ref 0–0.5)
IMM GRANULOCYTES NFR BLD AUTO: 1 % (ref 0–5)
LYMPHOCYTES # BLD: 1.6 K/UL (ref 0.5–4.6)
LYMPHOCYTES NFR BLD: 28 % (ref 13–44)
MCH RBC QN AUTO: 28.5 PG (ref 26.1–32.9)
MCHC RBC AUTO-ENTMCNC: 31.7 G/DL (ref 31.4–35)
MCV RBC AUTO: 90 FL (ref 79.6–97.8)
MONOCYTES # BLD: 1 K/UL (ref 0.1–1.3)
MONOCYTES NFR BLD: 17 % (ref 4–12)
NEUTS SEG # BLD: 2.4 K/UL (ref 1.7–8.2)
NEUTS SEG NFR BLD: 43 % (ref 43–78)
NRBC # BLD: 0 K/UL (ref 0–0.2)
PLATELET # BLD AUTO: 209 K/UL (ref 150–450)
PMV BLD AUTO: 10.6 FL (ref 9.4–12.3)
POTASSIUM SERPL-SCNC: 4 MMOL/L (ref 3.5–5.1)
PROT SERPL-MCNC: 6.6 G/DL (ref 6.3–8.2)
RBC # BLD AUTO: 4.7 M/UL (ref 4.23–5.6)
SODIUM SERPL-SCNC: 137 MMOL/L (ref 138–145)
WBC # BLD AUTO: 5.7 K/UL (ref 4.3–11.1)

## 2022-09-24 PROCEDURE — 6360000002 HC RX W HCPCS: Performed by: INTERNAL MEDICINE

## 2022-09-24 PROCEDURE — 85025 COMPLETE CBC W/AUTO DIFF WBC: CPT

## 2022-09-24 PROCEDURE — 36415 COLL VENOUS BLD VENIPUNCTURE: CPT

## 2022-09-24 PROCEDURE — 80143 DRUG ASSAY ACETAMINOPHEN: CPT

## 2022-09-24 PROCEDURE — 1100000000 HC RM PRIVATE

## 2022-09-24 PROCEDURE — 2580000003 HC RX 258: Performed by: INTERNAL MEDICINE

## 2022-09-24 PROCEDURE — 80053 COMPREHEN METABOLIC PANEL: CPT

## 2022-09-24 PROCEDURE — 6370000000 HC RX 637 (ALT 250 FOR IP): Performed by: INTERNAL MEDICINE

## 2022-09-24 RX ADMIN — ENOXAPARIN SODIUM 40 MG: 100 INJECTION SUBCUTANEOUS at 09:21

## 2022-09-24 RX ADMIN — ALPRAZOLAM 2 MG: 0.5 TABLET ORAL at 18:38

## 2022-09-24 RX ADMIN — SODIUM CHLORIDE, PRESERVATIVE FREE 10 ML: 5 INJECTION INTRAVENOUS at 09:21

## 2022-09-24 RX ADMIN — SODIUM CHLORIDE, PRESERVATIVE FREE 10 ML: 5 INJECTION INTRAVENOUS at 20:27

## 2022-09-24 NOTE — PROGRESS NOTES
Hospitalist Progress Note   Admit Date:  2022 10:54 PM   Name:  Samantha Solis   Age:  30 y.o. Sex:  male  :  1985   MRN:  [id number]   Room:  [facility]/    Presenting Complaint: Suicidal     Reason(s) for Admission: Suicide ideation [R45.851]  Bipolar 1 disorder ([facility] [address] [F31.9]  Accidental acetaminophen overdose, initial encounter [T39.1X1A]  Schizophrenia, unspecified type [facility] [F20.9]     Hospital Course:     Patient with past medical history of    Polysubstance abuse   Anxiety   Bipolar disorder     Patient presented to hospital with suicidal attempt. He planned to cut his wrist. He took undisclosed amount of Acetaminophen. He was found to have elevated liver enzymes. Patient is started on 20-hour course of Acetylcysteine since admission. Subjective & 24hr Events (22):     22   Patient is still suicidal.   He was seen by a tele-psychiatrist and is deemed at risk for suicide and in-patient psychiatric referral is recommended. No nausea. No vomiting. No abdominal pain   No shortness of breath. Assessment & Plan:     Principal Problem:    Suicide ideation    Suicide attempt ([facility] [address]  Bipolar I disorder, most recent episode depressed, severe w psychosis ([facility] [address]  Plan:   Seen by psychiatrist   Will order Zyprexa 5 mg po hs   Cogentin 1 mg po bid PRN   One on one watch   Pending referral to psychiatric patient unit. Active Problems:    Tylenol toxicity, accidental or unintentional, initial encounter  Plan:   Completed Acetyl cysteine course   Monitor symptoms and LFT       Substance use disorder  Plan:   I advised patient to quit. Anticipated discharge needs:      Pending referral     Diet:  ADULT DIET;  Regular; Safety Tray; Safety Tray (Disposables)  DVT PPx: Enoxaparin SC   Code status: Full Code    Hospital Problems:  Principal Problem:    Suicide ideation  Active Problems:    Tylenol toxicity, accidental or unintentional, initial encounter    Substance use disorder    Suicide attempt ([facility] [address]    Bipolar I disorder, most recent episode depressed, severe w psychosis ([facility] [address]  Resolved Problems:    * No resolved hospital problems. *      Objective:   Patient Vitals for the past 24 hrs:   Temp Pulse Resp BP SpO2   09/24/22 1215 98.2 °F (36.8 °C) 77 17 (!) 125/90 98 %   09/24/22 0737 98.1 °F (36.7 °C) 81 17 117/88 97 %   09/24/22 0311 97.5 °F (36.4 °C) 64 18 96/75 95 %   09/23/22 2315 98.1 °F (36.7 °C) 66 16 111/64 98 %   09/23/22 1911 98.4 °F (36.9 °C) 80 16 111/74 98 %   09/23/22 1438 98.1 °F (36.7 °C) 62 17 121/85 98 %       Oxygen Therapy  SpO2: 98 %  O2 Device: None (Room air)    Estimated body mass index is 28.25 kg/m² as calculated from the following:    Height as of this encounter: 6' 2\" (1.88 m). Weight as of this encounter: 220 lb (99.8 kg). Intake/Output Summary (Last 24 hours) at 9/24/2022 1243  Last data filed at 9/24/2022 0830  Gross per 24 hour   Intake 400 ml   Output --   Net 400 ml         Physical Exam:     Blood pressure (!) 125/90, pulse 77, temperature 98.2 °F (36.8 °C), temperature source Oral, resp. rate 17, height 6' 2\" (1.88 m), weight 220 lb (99.8 kg), SpO2 98 %. General:    Well nourished. Patient is resting well in bed. Afebrile. Talking and answering well. Head:  Normocephalic, atraumatic  Eyes:  Sclerae appear normal.  Pupils equally round. ENT:  Nares appear normal, no drainage. Moist oral mucosa  Neck:  No restricted ROM. Trachea midline   CV:   RRR. No m/r/g. No jugular venous distension. Lungs:   CTAB. No wheezing, rhonchi, or rales. Symmetric expansion. Abdomen: Bowel sounds present. Soft, nontender, nondistended. Extremities: No cyanosis or clubbing. No edema  Skin:     No rashes and normal coloration. Warm and dry. Neuro:  CN II-XII grossly intact. Sensation intact. A&Ox3  Psych:  Normal mood and affect. Still saying that he wants tp hurt himself.      I have personally reviewed labs and tests showing:  Recent Labs:  Recent Results (from the past 48 hour(s))   EKG 12 Lead    Collection Time: 09/22/22 10:48 PM   Result Value Ref Range    Ventricular Rate 100 BPM    Atrial Rate 100 BPM    P-R Interval 128 ms    QRS Duration 90 ms    Q-T Interval 350 ms    QTc Calculation (Bazett) 451 ms    P Axis 68 degrees    R Axis 88 degrees    T Axis -76 degrees    Diagnosis Normal sinus rhythm    CBC with Auto Differential    Collection Time: 09/22/22 10:52 PM   Result Value Ref Range    WBC 4.1 (L) 4.3 - 11.1 K/uL    RBC 4.87 4.23 - 5.6 M/uL    Hemoglobin 14.1 13.6 - 17.2 g/dL    Hematocrit 43.1 41.1 - 50.3 %    MCV 88.5 79.6 - 97.8 FL    MCH 29.0 26.1 - 32.9 PG    MCHC 32.7 31.4 - 35.0 g/dL    RDW 13.9 11.9 - 14.6 %    Platelets 255 672 - 002 K/uL    MPV 11.1 9.4 - 12.3 FL    nRBC 0.00 0.0 - 0.2 K/uL    Differential Type AUTOMATED      Seg Neutrophils 61 43 - 78 %    Lymphocytes 19 13 - 44 %    Monocytes 11 4.0 - 12.0 %    Eosinophils % 6 0.5 - 7.8 %    Basophils 1 0.0 - 2.0 %    Immature Granulocytes 2 0.0 - 5.0 %    Segs Absolute 2.5 1.7 - 8.2 K/UL    Absolute Lymph # 0.8 0.5 - 4.6 K/UL    Absolute Mono # 0.4 0.1 - 1.3 K/UL    Absolute Eos # 0.2 0.0 - 0.8 K/UL    Basophils Absolute 0.1 0.0 - 0.2 K/UL    Absolute Immature Granulocyte 0.1 0.0 - 0.5 K/UL   CMP    Collection Time: 09/22/22 10:52 PM   Result Value Ref Range    Sodium 131 (L) 136 - 145 mmol/L    Potassium 3.9 3.5 - 5.1 mmol/L    Chloride 102 101 - 110 mmol/L    CO2 24 21 - 32 mmol/L    Anion Gap 5 4 - 13 mmol/L    Glucose 113 (H) 65 - 100 mg/dL    BUN 10 6 - 23 MG/DL    Creatinine 1.00 0.8 - 1.5 MG/DL    GFR African American >60 >60 ml/min/1.73m2    GFR Non- >60 >60 ml/min/1.73m2    Calcium 8.3 8.3 - 10.4 MG/DL    Total Bilirubin 2.3 (H) 0.2 - 1.1 MG/DL    ALT 2,235 (H) 12 - 65 U/L    AST 1,677 (H) 15 - 37 U/L    Alk Phosphatase 88 50 - 136 U/L    Total Protein 6.9 6.3 - 8.2 g/dL    Albumin 3.2 (L) 3.5 - 5.0 g/dL    Globulin 3.7 (H) 2.3 - 3.5 g/dL Albumin/Globulin Ratio 0.9 (L) 1.2 - 3.5     Salicylate    Collection Time: 09/22/22 10:52 PM   Result Value Ref Range    Salicylate, Serum 71.6 (H) 2.8 - 20.0 MG/DL   Acetaminophen Level    Collection Time: 09/22/22 10:52 PM   Result Value Ref Range    Acetaminophen Level 72 (HH) 10.0 - 30.0 ug/mL   ETOH    Collection Time: 09/22/22 10:52 PM   Result Value Ref Range    Ethanol Lvl <3 MG/DL   Magnesium    Collection Time: 09/22/22 10:52 PM   Result Value Ref Range    Magnesium 2.4 1.8 - 2.4 mg/dL   Urine Drug Screen    Collection Time: 09/22/22 11:32 PM   Result Value Ref Range    PCP, Urine Negative NEG      Benzodiazepines, Urine Negative NEG      Cocaine, Urine Positive (A) NEG      Amphetamine, Urine Negative NEG      Methadone, Urine Negative NEG      THC, TH-Cannabinol, Urine Negative NEG      Opiates, Urine Positive (A) NEG      Barbiturates, Urine Negative NEG     Comprehensive Metabolic Panel    Collection Time: 09/23/22  8:02 AM   Result Value Ref Range    Sodium 134 (L) 138 - 145 mmol/L    Potassium 3.5 3.5 - 5.1 mmol/L    Chloride 100 (L) 101 - 110 mmol/L    CO2 26 21 - 32 mmol/L    Anion Gap 8 4 - 13 mmol/L    Glucose 115 (H) 65 - 100 mg/dL    BUN 8 6 - 23 MG/DL    Creatinine 0.70 (L) 0.8 - 1.5 MG/DL    GFR African American >60 >60 ml/min/1.73m2    GFR Non- >60 >60 ml/min/1.73m2    Calcium 8.6 8.3 - 10.4 MG/DL    Total Bilirubin 2.4 (H) 0.2 - 1.1 MG/DL    ALT 1,761 (H) 12 - 65 U/L     (H) 15 - 37 U/L    Alk Phosphatase 66 50 - 136 U/L    Total Protein 6.6 6.3 - 8.2 g/dL    Albumin 2.8 (L) 3.5 - 5.0 g/dL    Globulin 3.8 (H) 2.3 - 3.5 g/dL    Albumin/Globulin Ratio 0.7 (L) 1.2 - 3.5     Comprehensive Metabolic Panel w/ Reflex to MG    Collection Time: 09/24/22  5:20 AM   Result Value Ref Range    Sodium 137 (L) 138 - 145 mmol/L    Potassium 4.0 3.5 - 5.1 mmol/L    Chloride 105 101 - 110 mmol/L    CO2 28 21 - 32 mmol/L    Anion Gap 4 4 - 13 mmol/L    Glucose 103 (H) 65 - 100 mg/dL BUN 13 6 - 23 MG/DL    Creatinine 0.90 0.8 - 1.5 MG/DL    GFR African American >60 >60 ml/min/1.73m2    GFR Non- >60 >60 ml/min/1.73m2    Calcium 9.1 8.3 - 10.4 MG/DL    Total Bilirubin 2.8 (H) 0.2 - 1.1 MG/DL    ALT 1,191 (H) 12 - 65 U/L     (H) 15 - 37 U/L    Alk Phosphatase 72 50 - 136 U/L    Total Protein 6.6 6.3 - 8.2 g/dL    Albumin 2.7 (L) 3.5 - 5.0 g/dL    Globulin 3.9 (H) 2.3 - 3.5 g/dL    Albumin/Globulin Ratio 0.7 (L) 1.2 - 3.5     CBC with Auto Differential    Collection Time: 09/24/22  5:20 AM   Result Value Ref Range    WBC 5.7 4.3 - 11.1 K/uL    RBC 4.70 4.23 - 5.6 M/uL    Hemoglobin 13.4 (L) 13.6 - 17.2 g/dL    Hematocrit 42.3 41.1 - 50.3 %    MCV 90.0 79.6 - 97.8 FL    MCH 28.5 26.1 - 32.9 PG    MCHC 31.7 31.4 - 35.0 g/dL    RDW 13.7 11.9 - 14.6 %    Platelets 269 458 - 026 K/uL    MPV 10.6 9.4 - 12.3 FL    nRBC 0.00 0.0 - 0.2 K/uL    Differential Type AUTOMATED      Seg Neutrophils 43 43 - 78 %    Lymphocytes 28 13 - 44 %    Monocytes 17 (H) 4.0 - 12.0 %    Eosinophils % 10 (H) 0.5 - 7.8 %    Basophils 1 0.0 - 2.0 %    Immature Granulocytes 1 0.0 - 5.0 %    Segs Absolute 2.4 1.7 - 8.2 K/UL    Absolute Lymph # 1.6 0.5 - 4.6 K/UL    Absolute Mono # 1.0 0.1 - 1.3 K/UL    Absolute Eos # 0.6 0.0 - 0.8 K/UL    Basophils Absolute 0.1 0.0 - 0.2 K/UL    Absolute Immature Granulocyte 0.1 0.0 - 0.5 K/UL   Acetaminophen Level    Collection Time: 09/24/22  5:20 AM   Result Value Ref Range    Acetaminophen Level <2 (L) 10.0 - 30.0 ug/mL       I have personally reviewed imaging studies showing:   Other Studies:  No orders to display       Current Meds:  Current Facility-Administered Medications   Medication Dose Route Frequency    ALPRAZolam (XANAX) tablet 2 mg  2 mg Oral TID PRN    sodium chloride flush 0.9 % injection 5-40 mL  5-40 mL IntraVENous 2 times per day    sodium chloride flush 0.9 % injection 5-40 mL  5-40 mL IntraVENous PRN    0.9 % sodium chloride infusion   IntraVENous PRN enoxaparin (LOVENOX) injection 40 mg  40 mg SubCUTAneous Daily    ondansetron (ZOFRAN-ODT) disintegrating tablet 4 mg  4 mg Oral Q8H PRN    Or    ondansetron (ZOFRAN) injection 4 mg  4 mg IntraVENous Q6H PRN    polyethylene glycol (GLYCOLAX) packet 17 g  17 g Oral Daily PRN    nicotine (NICODERM CQ) 21 MG/24HR 1 patch  1 patch TransDERmal Daily    oxyCODONE (ROXICODONE) immediate release tablet 5 mg  5 mg Oral Q4H PRN    ibuprofen (ADVIL;MOTRIN) tablet 400 mg  400 mg Oral Q6H PRN       Signed:  Brandt Golden MD    Part of this note may have been written by using a voice dictation software. The note has been proof read but may still contain some grammatical/other typographical errors.

## 2022-09-24 NOTE — PROGRESS NOTES
Hourly rounds in progress. Alert, oriented, cooperative. Remains high risk and on suicide watch with sitter at bedside. Xanax x 1. Slept well. IV drip stopped 0300 per order.

## 2022-09-25 LAB
ALBUMIN SERPL-MCNC: 2.8 G/DL (ref 3.5–5)
ALBUMIN/GLOB SERPL: 0.8 {RATIO} (ref 1.2–3.5)
ALP SERPL-CCNC: 77 U/L (ref 50–136)
ALT SERPL-CCNC: 767 U/L (ref 12–65)
ANION GAP SERPL CALC-SCNC: 3 MMOL/L (ref 4–13)
AST SERPL-CCNC: 109 U/L (ref 15–37)
BILIRUB SERPL-MCNC: 2.7 MG/DL (ref 0.2–1.1)
BUN SERPL-MCNC: 14 MG/DL (ref 6–23)
CALCIUM SERPL-MCNC: 9 MG/DL (ref 8.3–10.4)
CHLORIDE SERPL-SCNC: 105 MMOL/L (ref 101–110)
CO2 SERPL-SCNC: 29 MMOL/L (ref 21–32)
CREAT SERPL-MCNC: 0.8 MG/DL (ref 0.8–1.5)
GLOBULIN SER CALC-MCNC: 3.5 G/DL (ref 2.3–3.5)
GLUCOSE SERPL-MCNC: 99 MG/DL (ref 65–100)
MAGNESIUM SERPL-MCNC: 2.1 MG/DL (ref 1.8–2.4)
POTASSIUM SERPL-SCNC: 3.5 MMOL/L (ref 3.5–5.1)
PROT SERPL-MCNC: 6.3 G/DL (ref 6.3–8.2)
SODIUM SERPL-SCNC: 137 MMOL/L (ref 138–145)

## 2022-09-25 PROCEDURE — 80053 COMPREHEN METABOLIC PANEL: CPT

## 2022-09-25 PROCEDURE — 83735 ASSAY OF MAGNESIUM: CPT

## 2022-09-25 PROCEDURE — 1100000000 HC RM PRIVATE

## 2022-09-25 PROCEDURE — 6370000000 HC RX 637 (ALT 250 FOR IP): Performed by: HOSPITALIST

## 2022-09-25 PROCEDURE — 36415 COLL VENOUS BLD VENIPUNCTURE: CPT

## 2022-09-25 PROCEDURE — 2580000003 HC RX 258: Performed by: INTERNAL MEDICINE

## 2022-09-25 PROCEDURE — 6370000000 HC RX 637 (ALT 250 FOR IP): Performed by: INTERNAL MEDICINE

## 2022-09-25 PROCEDURE — 6360000002 HC RX W HCPCS: Performed by: INTERNAL MEDICINE

## 2022-09-25 RX ORDER — ZOLPIDEM TARTRATE 5 MG/1
5 TABLET ORAL NIGHTLY
Status: DISCONTINUED | OUTPATIENT
Start: 2022-09-25 | End: 2022-10-03 | Stop reason: HOSPADM

## 2022-09-25 RX ADMIN — SODIUM CHLORIDE, PRESERVATIVE FREE 10 ML: 5 INJECTION INTRAVENOUS at 09:51

## 2022-09-25 RX ADMIN — ZOLPIDEM TARTRATE 5 MG: 5 TABLET ORAL at 21:34

## 2022-09-25 RX ADMIN — ENOXAPARIN SODIUM 40 MG: 100 INJECTION SUBCUTANEOUS at 08:57

## 2022-09-25 RX ADMIN — SODIUM CHLORIDE, PRESERVATIVE FREE 10 ML: 5 INJECTION INTRAVENOUS at 20:54

## 2022-09-25 RX ADMIN — ALPRAZOLAM 2 MG: 0.5 TABLET ORAL at 20:53

## 2022-09-25 RX ADMIN — ALPRAZOLAM 2 MG: 0.5 TABLET ORAL at 03:44

## 2022-09-25 ASSESSMENT — PAIN SCALES - GENERAL
PAINLEVEL_OUTOF10: 0
PAINLEVEL_OUTOF10: 0

## 2022-09-25 NOTE — PROGRESS NOTES
Hospitalist Progress Note   Admit Date:  2022 10:54 PM   Name:  Samantha Solis   Age:  30 y.o. Sex:  male  :  1985   MRN:  [id number]   [facility]  [facility]/    Presenting Complaint: Suicidal     Reason(s) for Admission: Suicide ideation [R45.851]  Bipolar 1 disorder ([facility] [address] [F31.9]  Accidental acetaminophen overdose, initial encounter [T39.1X1A]  Schizophrenia, unspecified type [facility] [F20.9]     Hospital Course:     Patient with past medical history of    Polysubstance abuse   Anxiety   Bipolar disorder     Patient presented to hospital with suicidal attempt. He planned to cut his wrist. He took undisclosed amount of Acetaminophen. He was found to have elevated liver enzymes. Patient is started on 20-hour course of Acetylcysteine since admission. Subjective & 24hr Events (22):     22   Patient is still suicidal.   He was seen by a tele-psychiatrist and is deemed at risk for suicide and in-patient psychiatric referral is recommended. No nausea. No vomiting. No abdominal pain   No shortness of breath. 22   Patient still reports feeling suicidal.   No fever. No shaking. No chills. No abdominal pain. Patient is eating by himself. Assessment & Plan:     Principal Problem:    Suicide ideation    Suicide attempt ([facility]  Bipolar I disorder, most recent episode depressed, severe w psychosis ([facility] [address]  Plan:   Patient is seen by psychiatrist who recommended the following medications; On Zyprexa 5 mg po hs   Cogentin 1 mg po bid PRN   One on one watch   Pending referral to psychiatric patient unit. Active Problems:    Tylenol toxicity, accidental or unintentional, initial encounter  Plan:   Completed Acetyl cysteine course   Monitor symptoms and LFT   LFT shows improvement of liver enzymes. Substance use disorder  Plan:   I advised patient to quit. Anticipated discharge needs:      Pending referral     Diet:  ADULT DIET;  Regular; Safety Tray; Safety Tray (Disposables)  DVT PPx: Enoxaparin SC   Code status: Full Code    Hospital Problems:  Principal Problem:    Suicide ideation  Active Problems:    Tylenol toxicity, accidental or unintentional, initial encounter    Substance use disorder    Suicide attempt ([facility] [address]    Bipolar I disorder, most recent episode depressed, severe w psychosis ([facility] [address]  Resolved Problems:    * No resolved hospital problems. *      Objective:   Patient Vitals for the past 24 hrs:   Temp Pulse Resp BP SpO2   09/25/22 0733 98.6 °F (37 °C) 70 16 107/69 99 %   09/25/22 0322 98.1 °F (36.7 °C) 65 14 124/89 98 %   09/24/22 2331 97.9 °F (36.6 °C) 65 18 124/75 97 %   09/24/22 1936 98.4 °F (36.9 °C) 64 12 129/80 97 %   09/24/22 1612 98.1 °F (36.7 °C) 75 15 123/81 97 %   09/24/22 1215 98.2 °F (36.8 °C) 77 17 (!) 125/90 98 %         Oxygen Therapy  SpO2: 99 %  O2 Device: None (Room air)    Estimated body mass index is 28.25 kg/m² as calculated from the following:    Height as of this encounter: 6' 2\" (1.88 m). Weight as of this encounter: 220 lb (99.8 kg). Intake/Output Summary (Last 24 hours) at 9/25/2022 0941  Last data filed at 9/25/2022 0905  Gross per 24 hour   Intake 850 ml   Output --   Net 850 ml           Physical Exam:     Blood pressure 107/69, pulse 70, temperature 98.6 °F (37 °C), temperature source Oral, resp. rate 16, height 6' 2\" (1.88 m), weight 220 lb (99.8 kg), SpO2 99 %. General:    Well nourished. Patient is sitting up in bed and eating breakfast.   Afebrile. Talking and answering well. Head:  Normocephalic, atraumatic  Eyes:  Sclerae appear normal.  Pupils equally round. ENT:  Nares appear normal, no drainage. Moist oral mucosa  Neck:  No restricted ROM. Trachea midline   CV:   RRR. No m/r/g. No jugular venous distension. Lungs:   CTAB. No wheezing, rhonchi, or rales. Symmetric expansion. Abdomen: Bowel sounds present. Soft, nontender, nondistended. Extremities: No cyanosis or clubbing.   No edema  Skin:     No rashes and normal coloration. Warm and dry. Neuro:  CN II-XII grossly intact. Sensation intact. A&Ox3  Psych:  Normal mood and affect. Still saying that he wants to hurt himself.      I have personally reviewed labs and tests showing:  Recent Labs:  Recent Results (from the past 48 hour(s))   Comprehensive Metabolic Panel w/ Reflex to MG    Collection Time: 09/24/22  5:20 AM   Result Value Ref Range    Sodium 137 (L) 138 - 145 mmol/L    Potassium 4.0 3.5 - 5.1 mmol/L    Chloride 105 101 - 110 mmol/L    CO2 28 21 - 32 mmol/L    Anion Gap 4 4 - 13 mmol/L    Glucose 103 (H) 65 - 100 mg/dL    BUN 13 6 - 23 MG/DL    Creatinine 0.90 0.8 - 1.5 MG/DL    GFR African American >60 >60 ml/min/1.73m2    GFR Non- >60 >60 ml/min/1.73m2    Calcium 9.1 8.3 - 10.4 MG/DL    Total Bilirubin 2.8 (H) 0.2 - 1.1 MG/DL    ALT 1,191 (H) 12 - 65 U/L     (H) 15 - 37 U/L    Alk Phosphatase 72 50 - 136 U/L    Total Protein 6.6 6.3 - 8.2 g/dL    Albumin 2.7 (L) 3.5 - 5.0 g/dL    Globulin 3.9 (H) 2.3 - 3.5 g/dL    Albumin/Globulin Ratio 0.7 (L) 1.2 - 3.5     CBC with Auto Differential    Collection Time: 09/24/22  5:20 AM   Result Value Ref Range    WBC 5.7 4.3 - 11.1 K/uL    RBC 4.70 4.23 - 5.6 M/uL    Hemoglobin 13.4 (L) 13.6 - 17.2 g/dL    Hematocrit 42.3 41.1 - 50.3 %    MCV 90.0 79.6 - 97.8 FL    MCH 28.5 26.1 - 32.9 PG    MCHC 31.7 31.4 - 35.0 g/dL    RDW 13.7 11.9 - 14.6 %    Platelets 770 889 - 602 K/uL    MPV 10.6 9.4 - 12.3 FL    nRBC 0.00 0.0 - 0.2 K/uL    Differential Type AUTOMATED      Seg Neutrophils 43 43 - 78 %    Lymphocytes 28 13 - 44 %    Monocytes 17 (H) 4.0 - 12.0 %    Eosinophils % 10 (H) 0.5 - 7.8 %    Basophils 1 0.0 - 2.0 %    Immature Granulocytes 1 0.0 - 5.0 %    Segs Absolute 2.4 1.7 - 8.2 K/UL    Absolute Lymph # 1.6 0.5 - 4.6 K/UL    Absolute Mono # 1.0 0.1 - 1.3 K/UL    Absolute Eos # 0.6 0.0 - 0.8 K/UL    Basophils Absolute 0.1 0.0 - 0.2 K/UL    Absolute Immature Granulocyte 0.1 0.0 - 0.5 K/UL   Acetaminophen Level    Collection Time: 09/24/22  5:20 AM   Result Value Ref Range    Acetaminophen Level <2 (L) 10.0 - 30.0 ug/mL   Comprehensive Metabolic Panel w/ Reflex to MG    Collection Time: 09/25/22  5:57 AM   Result Value Ref Range    Sodium 137 (L) 138 - 145 mmol/L    Potassium 3.5 3.5 - 5.1 mmol/L    Chloride 105 101 - 110 mmol/L    CO2 29 21 - 32 mmol/L    Anion Gap 3 (L) 4 - 13 mmol/L    Glucose 99 65 - 100 mg/dL    BUN 14 6 - 23 MG/DL    Creatinine 0.80 0.8 - 1.5 MG/DL    GFR African American >60 >60 ml/min/1.73m2    GFR Non- >60 >60 ml/min/1.73m2    Calcium 9.0 8.3 - 10.4 MG/DL    Total Bilirubin 2.7 (H) 0.2 - 1.1 MG/DL     (H) 12 - 65 U/L     (H) 15 - 37 U/L    Alk Phosphatase 77 50 - 136 U/L    Total Protein 6.3 6.3 - 8.2 g/dL    Albumin 2.8 (L) 3.5 - 5.0 g/dL    Globulin 3.5 2.3 - 3.5 g/dL    Albumin/Globulin Ratio 0.8 (L) 1.2 - 3.5     Magnesium    Collection Time: 09/25/22  5:57 AM   Result Value Ref Range    Magnesium 2.1 1.8 - 2.4 mg/dL       I have personally reviewed imaging studies showing:   Other Studies:  No orders to display       Current Meds:  Current Facility-Administered Medications   Medication Dose Route Frequency    ALPRAZolam (XANAX) tablet 2 mg  2 mg Oral TID PRN    sodium chloride flush 0.9 % injection 5-40 mL  5-40 mL IntraVENous 2 times per day    sodium chloride flush 0.9 % injection 5-40 mL  5-40 mL IntraVENous PRN    0.9 % sodium chloride infusion   IntraVENous PRN    enoxaparin (LOVENOX) injection 40 mg  40 mg SubCUTAneous Daily    ondansetron (ZOFRAN-ODT) disintegrating tablet 4 mg  4 mg Oral Q8H PRN    Or    ondansetron (ZOFRAN) injection 4 mg  4 mg IntraVENous Q6H PRN    polyethylene glycol (GLYCOLAX) packet 17 g  17 g Oral Daily PRN    nicotine (NICODERM CQ) 21 MG/24HR 1 patch  1 patch TransDERmal Daily    oxyCODONE (ROXICODONE) immediate release tablet 5 mg  5 mg Oral Q4H PRN    ibuprofen (ADVIL;MOTRIN) tablet 400 mg  400 mg Oral Q6H PRN       Signed:  Mahnaz Ward MD    Part of this note may have been written by using a voice dictation software. The note has been proof read but may still contain some grammatical/other typographical errors.

## 2022-09-25 NOTE — PROGRESS NOTES
Reviewed notes for new spiritual concerns  Noted the patient is calm and cooperative  Sitter present

## 2022-09-26 LAB
ALBUMIN SERPL-MCNC: 2.8 G/DL (ref 3.5–5)
ALBUMIN/GLOB SERPL: 0.8 {RATIO} (ref 1.2–3.5)
ALP SERPL-CCNC: 82 U/L (ref 50–136)
ALT SERPL-CCNC: 550 U/L (ref 12–65)
ANION GAP SERPL CALC-SCNC: 5 MMOL/L (ref 4–13)
AST SERPL-CCNC: 56 U/L (ref 15–37)
BILIRUB SERPL-MCNC: 1.7 MG/DL (ref 0.2–1.1)
BUN SERPL-MCNC: 16 MG/DL (ref 6–23)
CALCIUM SERPL-MCNC: 9 MG/DL (ref 8.3–10.4)
CHLORIDE SERPL-SCNC: 108 MMOL/L (ref 101–110)
CO2 SERPL-SCNC: 27 MMOL/L (ref 21–32)
CREAT SERPL-MCNC: 0.8 MG/DL (ref 0.8–1.5)
GLOBULIN SER CALC-MCNC: 3.7 G/DL (ref 2.3–3.5)
GLUCOSE SERPL-MCNC: 90 MG/DL (ref 65–100)
POTASSIUM SERPL-SCNC: 4.1 MMOL/L (ref 3.5–5.1)
PROT SERPL-MCNC: 6.5 G/DL (ref 6.3–8.2)
SODIUM SERPL-SCNC: 140 MMOL/L (ref 138–145)

## 2022-09-26 PROCEDURE — 6360000002 HC RX W HCPCS: Performed by: INTERNAL MEDICINE

## 2022-09-26 PROCEDURE — 6370000000 HC RX 637 (ALT 250 FOR IP): Performed by: INTERNAL MEDICINE

## 2022-09-26 PROCEDURE — 6370000000 HC RX 637 (ALT 250 FOR IP): Performed by: HOSPITALIST

## 2022-09-26 PROCEDURE — 2580000003 HC RX 258: Performed by: INTERNAL MEDICINE

## 2022-09-26 PROCEDURE — 36415 COLL VENOUS BLD VENIPUNCTURE: CPT

## 2022-09-26 PROCEDURE — 80053 COMPREHEN METABOLIC PANEL: CPT

## 2022-09-26 PROCEDURE — 1100000000 HC RM PRIVATE

## 2022-09-26 RX ADMIN — ZOLPIDEM TARTRATE 5 MG: 5 TABLET ORAL at 21:19

## 2022-09-26 RX ADMIN — ALPRAZOLAM 2 MG: 0.5 TABLET ORAL at 09:15

## 2022-09-26 RX ADMIN — ENOXAPARIN SODIUM 40 MG: 100 INJECTION SUBCUTANEOUS at 08:33

## 2022-09-26 RX ADMIN — SODIUM CHLORIDE, PRESERVATIVE FREE 10 ML: 5 INJECTION INTRAVENOUS at 21:20

## 2022-09-26 RX ADMIN — SODIUM CHLORIDE, PRESERVATIVE FREE 10 ML: 5 INJECTION INTRAVENOUS at 08:36

## 2022-09-26 RX ADMIN — ALPRAZOLAM 2 MG: 0.5 TABLET ORAL at 20:13

## 2022-09-26 ASSESSMENT — PAIN SCALES - GENERAL
PAINLEVEL_OUTOF10: 0
PAINLEVEL_OUTOF10: 0

## 2022-09-26 NOTE — CARE COORDINATION
Chart reviewed by CM for continued stay. LOS 3 days. IVC papers renewed by attending physician (Dr. Jay Chau) today at 11:04am. Tele-psychiatry re-consulted to assess patient. Discharge plan TBD, pending psych eval. Patient uninsured. CM will continue to follow care plan, to assist further with discharge planning. Please consult CM if additional discharge needs arise.

## 2022-09-26 NOTE — PROGRESS NOTES
Hospitalist Progress Note   Admit Date:  2022 10:54 PM   Name:  Loli Mckee   Age:  30 y.o. Sex:  male  :  1985   MRN:  [id number]   Room:  [facility]/    Presenting Complaint: Suicidal     Reason(s) for Admission: Suicide ideation [R45.851]  Bipolar 1 disorder ([facility] [F31.9]  Accidental acetaminophen overdose, initial encounter [T39.1X1A]  Schizophrenia, unspecified type [facility]) [F20.9]     Hospital Course:     Patient with past medical history of  Polysubstance abuse, Anxiety, Bipolar disorder admitted with suicidal ideation and suicidal attempt and suicidal attempt. and suicidal attempt. He planned to cut his wrist. He took undisclosed amount of Acetaminophen. He was found to have elevated liver enzymes. S/p acetylcysteine. On commitment papers, renewed on        Subjective & 24hr Events (22): Seen at the bedside. Reports he is feeling better today. Denies any active suicidal ideation. Denies chest pain, palpitation, nausea, vomiting or abdominal pain. Assessment & Plan:     Suicide ideation  Suicide attempt  Bipolar I disorder, most recent episode depressed, severe w psychosis   Patient is seen by psychiatrist who recommended the following medications; On Zyprexa 5 mg po hs   Cogentin 1 mg po bid PRN   On commitment paper, renewed on   Psych reconsulted on      Tylenol toxicity:  Completed Acetyl cysteine course   Monitor symptoms and LFT   LFT shows improvement of liver enzymes. Substance use disorder  Advised patient to quit. Anticipated discharge needs:      Pending referral     Diet:  ADULT DIET;  Regular; Safety Tray; Safety Tray (Disposables)  DVT PPx: Enoxaparin SC   Code status: Full Code    Hospital Problems:  Principal Problem:    Suicide ideation  Active Problems:    Tylenol toxicity, accidental or unintentional, initial encounter    Substance use disorder    Suicide attempt ([facility] [address]    Bipolar I disorder, most recent episode depressed, severe w psychosis ([address]  Resolved Problems:    * No resolved hospital problems. *      Objective:   Patient Vitals for the past 24 hrs:   Temp Pulse Resp BP SpO2   09/26/22 1156 98.6 °F (37 °C) 67 -- 136/85 99 %   09/26/22 0725 97.3 °F (36.3 °C) 64 16 (!) 135/95 99 %   09/26/22 0354 98.4 °F (36.9 °C) 67 18 111/68 99 %   09/25/22 2344 98.2 °F (36.8 °C) 73 18 119/86 100 %   09/25/22 1937 97.5 °F (36.4 °C) 65 18 (!) 143/91 100 %   09/25/22 1535 98.4 °F (36.9 °C) 58 16 119/68 97 %         Oxygen Therapy  SpO2: 99 %  O2 Device: None (Room air)    Estimated body mass index is 28.25 kg/m² as calculated from the following:    Height as of this encounter: 6' 2\" (1.88 m). Weight as of this encounter: 220 lb (99.8 kg). No intake or output data in the 24 hours ending 09/26/22 1425        Physical Exam:     Blood pressure 136/85, pulse 67, temperature 98.6 °F (37 °C), temperature source Oral, resp. rate 16, height 6' 2\" (1.88 m), weight 220 lb (99.8 kg), SpO2 99 %. General:    Well nourished. Patient is sitting up in bed and eating breakfast.   Afebrile. Talking and answering well. Head:  Normocephalic, atraumatic  Eyes:  Sclerae appear normal.  Pupils equally round. ENT:  Nares appear normal, no drainage. Moist oral mucosa  Neck:  No restricted ROM. Trachea midline   CV:   RRR. No m/r/g. No jugular venous distension. Lungs:   CTAB. No wheezing, rhonchi, or rales. Symmetric expansion. Abdomen: Bowel sounds present. Soft, nontender, nondistended. Extremities: No cyanosis or clubbing. No edema  Skin:     No rashes and normal coloration. Warm and dry. Neuro:  CN II-XII grossly intact. Sensation intact. A&Ox3  Psych:  Normal mood and affect. Still saying that he wants to hurt himself.      I have personally reviewed labs and tests showing:  Recent Labs:  Recent Results (from the past 48 hour(s))   Comprehensive Metabolic Panel w/ Reflex to MG    Collection Time: 09/25/22  5:57 AM   Result Value Ref Range    Sodium 137 (L) 138 - 145 mmol/L    Potassium 3.5 3.5 - 5.1 mmol/L    Chloride 105 101 - 110 mmol/L    CO2 29 21 - 32 mmol/L    Anion Gap 3 (L) 4 - 13 mmol/L    Glucose 99 65 - 100 mg/dL    BUN 14 6 - 23 MG/DL    Creatinine 0.80 0.8 - 1.5 MG/DL    GFR African American >60 >60 ml/min/1.73m2    GFR Non- >60 >60 ml/min/1.73m2    Calcium 9.0 8.3 - 10.4 MG/DL    Total Bilirubin 2.7 (H) 0.2 - 1.1 MG/DL     (H) 12 - 65 U/L     (H) 15 - 37 U/L    Alk Phosphatase 77 50 - 136 U/L    Total Protein 6.3 6.3 - 8.2 g/dL    Albumin 2.8 (L) 3.5 - 5.0 g/dL    Globulin 3.5 2.3 - 3.5 g/dL    Albumin/Globulin Ratio 0.8 (L) 1.2 - 3.5     Magnesium    Collection Time: 09/25/22  5:57 AM   Result Value Ref Range    Magnesium 2.1 1.8 - 2.4 mg/dL   Comprehensive Metabolic Panel w/ Reflex to MG    Collection Time: 09/26/22  5:47 AM   Result Value Ref Range    Sodium 140 138 - 145 mmol/L    Potassium 4.1 3.5 - 5.1 mmol/L    Chloride 108 101 - 110 mmol/L    CO2 27 21 - 32 mmol/L    Anion Gap 5 4 - 13 mmol/L    Glucose 90 65 - 100 mg/dL    BUN 16 6 - 23 MG/DL    Creatinine 0.80 0.8 - 1.5 MG/DL    GFR African American >60 >60 ml/min/1.73m2    GFR Non- >60 >60 ml/min/1.73m2    Calcium 9.0 8.3 - 10.4 MG/DL    Total Bilirubin 1.7 (H) 0.2 - 1.1 MG/DL     (H) 12 - 65 U/L    AST 56 (H) 15 - 37 U/L    Alk Phosphatase 82 50 - 136 U/L    Total Protein 6.5 6.3 - 8.2 g/dL    Albumin 2.8 (L) 3.5 - 5.0 g/dL    Globulin 3.7 (H) 2.3 - 3.5 g/dL    Albumin/Globulin Ratio 0.8 (L) 1.2 - 3.5         I have personally reviewed imaging studies showing:   Other Studies:  No orders to display       Current Meds:  Current Facility-Administered Medications   Medication Dose Route Frequency    zolpidem (AMBIEN) tablet 5 mg  5 mg Oral Nightly    ALPRAZolam (XANAX) tablet 2 mg  2 mg Oral TID PRN    sodium chloride flush 0.9 % injection 5-40 mL  5-40 mL IntraVENous 2 times per day    sodium chloride flush 0.9 % injection 5-40 mL 5-40 mL IntraVENous PRN    0.9 % sodium chloride infusion   IntraVENous PRN    enoxaparin (LOVENOX) injection 40 mg  40 mg SubCUTAneous Daily    ondansetron (ZOFRAN-ODT) disintegrating tablet 4 mg  4 mg Oral Q8H PRN    Or    ondansetron (ZOFRAN) injection 4 mg  4 mg IntraVENous Q6H PRN    polyethylene glycol (GLYCOLAX) packet 17 g  17 g Oral Daily PRN    nicotine (NICODERM CQ) 21 MG/24HR 1 patch  1 patch TransDERmal Daily    oxyCODONE (ROXICODONE) immediate release tablet 5 mg  5 mg Oral Q4H PRN    ibuprofen (ADVIL;MOTRIN) tablet 400 mg  400 mg Oral Q6H PRN       Signed:  Guillermo Chase MD    Part of this note may have been written by using a voice dictation software. The note has been proof read but may still contain some grammatical/other typographical errors.

## 2022-09-26 NOTE — PLAN OF CARE
Problem: Self Harm/Suicidality  Goal: Will have no self-injury during hospital stay  Description: INTERVENTIONS:  1. Q 30 MINUTES: Routine safety checks  2. Q SHIFT & PRN: Assess risk to determine if routine checks are adequate to maintain patient safety  Outcome: Progressing     Problem: Discharge Planning  Goal: Discharge to home or other facility with appropriate resources  Outcome: Progressing  Flowsheets (Taken 9/26/2022 0725 by Kai Michael RN)  Discharge to home or other facility with appropriate resources:   Identify barriers to discharge with patient and caregiver   Arrange for needed discharge resources and transportation as appropriate   Identify discharge learning needs (meds, wound care, etc)     Problem: Safety - Adult  Goal: Free from fall injury  Outcome: Progressing  Flowsheets  Taken 9/26/2022 1945 by Marce Damon RN  Free From Fall Injury: Instruct family/caregiver on patient safety  Taken 9/26/2022 0725 by Kai Michael RN  Free From Fall Injury: Instruct family/caregiver on patient safety     Problem: Pain  Goal: Verbalizes/displays adequate comfort level or baseline comfort level  Outcome: Progressing  Flowsheets  Taken 9/26/2022 1930 by Marce Damon RN  Verbalizes/displays adequate comfort level or baseline comfort level:   Encourage patient to monitor pain and request assistance   Assess pain using appropriate pain scale   Administer analgesics based on type and severity of pain and evaluate response   Implement non-pharmacological measures as appropriate and evaluate response   Consider cultural and social influences on pain and pain management   Notify Licensed Independent Practitioner if interventions unsuccessful or patient reports new pain  Taken 9/26/2022 0725 by Kai Michael RN  Verbalizes/displays adequate comfort level or baseline comfort level:   Encourage patient to monitor pain and request assistance   Assess pain using appropriate pain scale   Administer analgesics based on type and severity of pain and evaluate response

## 2022-09-27 LAB
ALBUMIN SERPL-MCNC: 3 G/DL (ref 3.5–5)
ALBUMIN/GLOB SERPL: 0.8 {RATIO} (ref 1.2–3.5)
ALP SERPL-CCNC: 80 U/L (ref 50–136)
ALT SERPL-CCNC: 395 U/L (ref 12–65)
ANION GAP SERPL CALC-SCNC: 2 MMOL/L (ref 4–13)
AST SERPL-CCNC: 33 U/L (ref 15–37)
BILIRUB SERPL-MCNC: 0.8 MG/DL (ref 0.2–1.1)
BUN SERPL-MCNC: 12 MG/DL (ref 6–23)
CALCIUM SERPL-MCNC: 8.9 MG/DL (ref 8.3–10.4)
CHLORIDE SERPL-SCNC: 108 MMOL/L (ref 101–110)
CO2 SERPL-SCNC: 28 MMOL/L (ref 21–32)
CREAT SERPL-MCNC: 0.9 MG/DL (ref 0.8–1.5)
GLOBULIN SER CALC-MCNC: 3.8 G/DL (ref 2.3–3.5)
GLUCOSE SERPL-MCNC: 116 MG/DL (ref 65–100)
POTASSIUM SERPL-SCNC: 4.3 MMOL/L (ref 3.5–5.1)
PROT SERPL-MCNC: 6.8 G/DL (ref 6.3–8.2)
SODIUM SERPL-SCNC: 138 MMOL/L (ref 138–145)

## 2022-09-27 PROCEDURE — 1100000000 HC RM PRIVATE

## 2022-09-27 PROCEDURE — 36415 COLL VENOUS BLD VENIPUNCTURE: CPT

## 2022-09-27 PROCEDURE — 80053 COMPREHEN METABOLIC PANEL: CPT

## 2022-09-27 PROCEDURE — 6370000000 HC RX 637 (ALT 250 FOR IP): Performed by: HOSPITALIST

## 2022-09-27 PROCEDURE — 2580000003 HC RX 258: Performed by: INTERNAL MEDICINE

## 2022-09-27 PROCEDURE — 6370000000 HC RX 637 (ALT 250 FOR IP): Performed by: INTERNAL MEDICINE

## 2022-09-27 RX ADMIN — ZOLPIDEM TARTRATE 5 MG: 5 TABLET ORAL at 21:41

## 2022-09-27 RX ADMIN — SODIUM CHLORIDE, PRESERVATIVE FREE 10 ML: 5 INJECTION INTRAVENOUS at 09:54

## 2022-09-27 RX ADMIN — SODIUM CHLORIDE, PRESERVATIVE FREE 10 ML: 5 INJECTION INTRAVENOUS at 20:36

## 2022-09-27 RX ADMIN — ALPRAZOLAM 2 MG: 0.5 TABLET ORAL at 13:45

## 2022-09-27 RX ADMIN — ALPRAZOLAM 2 MG: 0.5 TABLET ORAL at 20:06

## 2022-09-27 ASSESSMENT — PAIN SCALES - GENERAL
PAINLEVEL_OUTOF10: 0
PAINLEVEL_OUTOF10: 0

## 2022-09-27 NOTE — PLAN OF CARE
Problem: Self Harm/Suicidality  Goal: Will have no self-injury during hospital stay  Description: INTERVENTIONS:  1. Q 30 MINUTES: Routine safety checks  2. Q SHIFT & PRN: Assess risk to determine if routine checks are adequate to maintain patient safety  9/27/2022 1910 by Bonny Sacks, RN  Outcome: Progressing  9/27/2022 1910 by Bonny Sacks, RN  Outcome: Progressing     Problem: Discharge Planning  Goal: Discharge to home or other facility with appropriate resources  9/27/2022 1910 by Bonny Sacks, RN  Outcome: Progressing  Flowsheets (Taken 9/27/2022 0740 by Vivien Krabbe, RN)  Discharge to home or other facility with appropriate resources:   Identify barriers to discharge with patient and caregiver   Arrange for needed discharge resources and transportation as appropriate   Identify discharge learning needs (meds, wound care, etc)  9/27/2022 1910 by Bonny Sacks, RN  Outcome: Progressing  Flowsheets (Taken 9/27/2022 0740 by Vivien Krabbe, RN)  Discharge to home or other facility with appropriate resources:   Identify barriers to discharge with patient and caregiver   Arrange for needed discharge resources and transportation as appropriate   Identify discharge learning needs (meds, wound care, etc)     Problem: Safety - Adult  Goal: Free from fall injury  9/27/2022 1910 by Bonny Sacks, RN  Outcome: Progressing  Flowsheets  Taken 9/27/2022 1910 by Bonny Sacks, RN  Free From Fall Injury: Instruct family/caregiver on patient safety  Taken 9/27/2022 0740 by Vivien Krabbe, RN  Free From Fall Injury: Instruct family/caregiver on patient safety  9/27/2022 1910 by Bonny Sacks, RN  Outcome: Progressing  Flowsheets  Taken 9/27/2022 1910 by Bonny Sacks, RN  Free From Fall Injury: Instruct family/caregiver on patient safety  Taken 9/27/2022 0740 by Vivien Krabbe, RN  Free From Fall Injury: Instruct family/caregiver on patient safety     Problem: Pain  Goal: Verbalizes/displays adequate comfort level or baseline comfort level  Outcome: Progressing  Flowsheets  Taken 9/27/2022 1900 by Yelitza Sue RN  Verbalizes/displays adequate comfort level or baseline comfort level:   Encourage patient to monitor pain and request assistance   Assess pain using appropriate pain scale   Administer analgesics based on type and severity of pain and evaluate response   Implement non-pharmacological measures as appropriate and evaluate response   Consider cultural and social influences on pain and pain management   Notify Licensed Independent Practitioner if interventions unsuccessful or patient reports new pain  Taken 9/27/2022 0740 by Darwin Oakley RN  Verbalizes/displays adequate comfort level or baseline comfort level:   Encourage patient to monitor pain and request assistance   Assess pain using appropriate pain scale   Administer analgesics based on type and severity of pain and evaluate response

## 2022-09-27 NOTE — CARE COORDINATION
Chart reviewed by YANNICK for discharge planning. LOS 4 days. Patient involuntarily committed. Patient evaluated by tele-psychiatry today. According to psych evaluation, patient remains depressed and continues to endorse suicidal thoughts. CM faxed [facility]. CM contacted Verito Condon [phone] to complete phone screen with Verito Condon.  CM to fax referral.

## 2022-09-27 NOTE — PROGRESS NOTES
Hospitalist Progress Note   Admit Date:  2022 10:54 PM   Name:  Norma Vila   Age:  30 y.o. Sex:  male  :  1985   MRN:  [id number]   Room:  [facility]/    Presenting Complaint: Suicidal     Reason(s) for Admission: Suicide ideation [R45.851]  Bipolar 1 disorder ([facility] [address] [F31.9]  Accidental acetaminophen overdose, initial encounter [T39.1X1A]  Schizophrenia, unspecified type [facility]) [F20.9]     Hospital Course:   Patient with past medical history of  Polysubstance abuse, Anxiety, Bipolar disorder admitted with suicidal ideation and suicidal attempt and suicidal attempt. and suicidal attempt. He planned to cut his wrist. He took undisclosed amount of Acetaminophen. He was found to have elevated liver enzymes. S/p acetylcysteine. On commitment papers, renewed on       Subjective & 24hr Events (22): Says didn't have good sleep  No suicidal thoughts today but has them from time to time- last was yesterday      Assessment & Plan:   Suicide ideation  Suicide attempt  Bipolar I disorder, most recent episode depressed, severe w psychosis   Patient is seen by psychiatrist who recommended the following medications; On Zyprexa 5 mg po hs   Cogentin 1 mg po bid PRN   On commitment paper, renewed on   Psych reconsulted on - pt calm,No suicidal thoughts today but has them from time to time- last was yesterday     Tylenol toxicity:  Completed Acetyl cysteine course   Monitor symptoms and LFT   LFT shows improvement of liver enzymes. Substance use disorder  Advised patient to quit. Anticipated discharge needs:      Pending referral      Diet:  ADULT DIET;  Regular; Safety Tray; Safety Tray (Disposables)  DVT PPx: Enoxaparin SC   Code status: Full Code    Hospital Problems:  Principal Problem:    Suicide ideation  Active Problems:    Tylenol toxicity, accidental or unintentional, initial encounter    Substance use disorder    Suicide attempt ([facility]    Bipolar I disorder, most recent episode depressed, severe w psychosis ([facility] [address]  Resolved Problems:    * No resolved hospital problems. *      Objective:   Patient Vitals for the past 24 hrs:   Temp Pulse Resp BP SpO2   09/27/22 0749 98.6 °F (37 °C) 64 20 (!) 135/92 99 %   09/27/22 0322 98.1 °F (36.7 °C) 65 18 134/88 98 %   09/26/22 2343 98.2 °F (36.8 °C) 77 -- 122/83 100 %   09/26/22 2000 98.1 °F (36.7 °C) 71 18 (!) 142/99 98 %   09/26/22 1526 97.9 °F (36.6 °C) 70 16 118/76 98 %   09/26/22 1156 98.6 °F (37 °C) 67 -- 136/85 99 %       Oxygen Therapy  SpO2: 99 %  O2 Device: None (Room air)    Estimated body mass index is 28.25 kg/m² as calculated from the following:    Height as of this encounter: 6' 2\" (1.88 m). Weight as of this encounter: 220 lb (99.8 kg). Intake/Output Summary (Last 24 hours) at 9/27/2022 0931  Last data filed at 9/26/2022 1852  Gross per 24 hour   Intake 240 ml   Output --   Net 240 ml         Physical Exam:     Blood pressure (!) 135/92, pulse 64, temperature 98.6 °F (37 °C), temperature source Oral, resp. rate 20, height 6' 2\" (1.88 m), weight 220 lb (99.8 kg), SpO2 99 %. General:    Well nourished. Head:  Normocephalic, atraumatic  Eyes:  Sclerae appear normal.  Pupils equally round. ENT:  Nares appear normal, no drainage. Moist oral mucosa  Neck:  No restricted ROM. Trachea midline   CV:   RRR. No m/r/g. No jugular venous distension. Lungs:   CTAB. No wheezing, rhonchi, or rales. Symmetric expansion. Abdomen: Bowel sounds present. Soft, nontender, nondistended. Extremities: No cyanosis or clubbing. No edema  Skin:     No rashes and normal coloration. Warm and dry. Neuro:  CN II-XII grossly intact. Sensation intact.   A&Ox3  Psych:  Calm, on and off suicidal thoughts    I have personally reviewed labs and tests showing:  Recent Labs:  Recent Results (from the past 48 hour(s))   Comprehensive Metabolic Panel w/ Reflex to MG    Collection Time: 09/26/22  5:47 AM   Result Value Ref Range Sodium 140 138 - 145 mmol/L    Potassium 4.1 3.5 - 5.1 mmol/L    Chloride 108 101 - 110 mmol/L    CO2 27 21 - 32 mmol/L    Anion Gap 5 4 - 13 mmol/L    Glucose 90 65 - 100 mg/dL    BUN 16 6 - 23 MG/DL    Creatinine 0.80 0.8 - 1.5 MG/DL    GFR African American >60 >60 ml/min/1.73m2    GFR Non- >60 >60 ml/min/1.73m2    Calcium 9.0 8.3 - 10.4 MG/DL    Total Bilirubin 1.7 (H) 0.2 - 1.1 MG/DL     (H) 12 - 65 U/L    AST 56 (H) 15 - 37 U/L    Alk Phosphatase 82 50 - 136 U/L    Total Protein 6.5 6.3 - 8.2 g/dL    Albumin 2.8 (L) 3.5 - 5.0 g/dL    Globulin 3.7 (H) 2.3 - 3.5 g/dL    Albumin/Globulin Ratio 0.8 (L) 1.2 - 3.5         I have personally reviewed imaging studies showing:   Other Studies:  No orders to display       Current Meds:  Current Facility-Administered Medications   Medication Dose Route Frequency    zolpidem (AMBIEN) tablet 5 mg  5 mg Oral Nightly    ALPRAZolam (XANAX) tablet 2 mg  2 mg Oral TID PRN    sodium chloride flush 0.9 % injection 5-40 mL  5-40 mL IntraVENous 2 times per day    sodium chloride flush 0.9 % injection 5-40 mL  5-40 mL IntraVENous PRN    0.9 % sodium chloride infusion   IntraVENous PRN    enoxaparin (LOVENOX) injection 40 mg  40 mg SubCUTAneous Daily    ondansetron (ZOFRAN-ODT) disintegrating tablet 4 mg  4 mg Oral Q8H PRN    Or    ondansetron (ZOFRAN) injection 4 mg  4 mg IntraVENous Q6H PRN    polyethylene glycol (GLYCOLAX) packet 17 g  17 g Oral Daily PRN    nicotine (NICODERM CQ) 21 MG/24HR 1 patch  1 patch TransDERmal Daily    oxyCODONE (ROXICODONE) immediate release tablet 5 mg  5 mg Oral Q4H PRN    ibuprofen (ADVIL;MOTRIN) tablet 400 mg  400 mg Oral Q6H PRN       Signed:  Catalina Rainey MD

## 2022-09-28 LAB
ALBUMIN SERPL-MCNC: 3 G/DL (ref 3.5–5)
ALBUMIN/GLOB SERPL: 0.8 {RATIO} (ref 1.2–3.5)
ALP SERPL-CCNC: 91 U/L (ref 50–136)
ALT SERPL-CCNC: 325 U/L (ref 12–65)
ANION GAP SERPL CALC-SCNC: 1 MMOL/L (ref 4–13)
AST SERPL-CCNC: 23 U/L (ref 15–37)
BILIRUB SERPL-MCNC: 0.6 MG/DL (ref 0.2–1.1)
BUN SERPL-MCNC: 11 MG/DL (ref 6–23)
CALCIUM SERPL-MCNC: 9.5 MG/DL (ref 8.3–10.4)
CHLORIDE SERPL-SCNC: 112 MMOL/L (ref 101–110)
CO2 SERPL-SCNC: 26 MMOL/L (ref 21–32)
CREAT SERPL-MCNC: 0.9 MG/DL (ref 0.8–1.5)
GLOBULIN SER CALC-MCNC: 3.7 G/DL (ref 2.3–3.5)
GLUCOSE SERPL-MCNC: 90 MG/DL (ref 65–100)
INR PPP: 0.9
POTASSIUM SERPL-SCNC: 5 MMOL/L (ref 3.5–5.1)
PROT SERPL-MCNC: 6.7 G/DL (ref 6.3–8.2)
PROTHROMBIN TIME: 12.1 SEC (ref 12.6–14.5)
SODIUM SERPL-SCNC: 139 MMOL/L (ref 136–145)

## 2022-09-28 PROCEDURE — 2580000003 HC RX 258: Performed by: INTERNAL MEDICINE

## 2022-09-28 PROCEDURE — 36415 COLL VENOUS BLD VENIPUNCTURE: CPT

## 2022-09-28 PROCEDURE — 6370000000 HC RX 637 (ALT 250 FOR IP): Performed by: HOSPITALIST

## 2022-09-28 PROCEDURE — 6360000002 HC RX W HCPCS: Performed by: INTERNAL MEDICINE

## 2022-09-28 PROCEDURE — 6370000000 HC RX 637 (ALT 250 FOR IP): Performed by: INTERNAL MEDICINE

## 2022-09-28 PROCEDURE — 85610 PROTHROMBIN TIME: CPT

## 2022-09-28 PROCEDURE — 80053 COMPREHEN METABOLIC PANEL: CPT

## 2022-09-28 PROCEDURE — 1100000000 HC RM PRIVATE

## 2022-09-28 RX ADMIN — ALPRAZOLAM 2 MG: 0.5 TABLET ORAL at 14:48

## 2022-09-28 RX ADMIN — ZOLPIDEM TARTRATE 5 MG: 5 TABLET ORAL at 21:20

## 2022-09-28 RX ADMIN — ENOXAPARIN SODIUM 40 MG: 100 INJECTION SUBCUTANEOUS at 09:29

## 2022-09-28 RX ADMIN — SODIUM CHLORIDE, PRESERVATIVE FREE 10 ML: 5 INJECTION INTRAVENOUS at 09:29

## 2022-09-28 RX ADMIN — SODIUM CHLORIDE, PRESERVATIVE FREE 10 ML: 5 INJECTION INTRAVENOUS at 21:21

## 2022-09-28 RX ADMIN — ALPRAZOLAM 2 MG: 0.5 TABLET ORAL at 05:50

## 2022-09-28 RX ADMIN — ALPRAZOLAM 2 MG: 0.5 TABLET ORAL at 21:20

## 2022-09-28 NOTE — CARE COORDINATION
CM met with patient to discuss discharge planning. Patient is a 30year old male, with a medical history of polysubstance abuse, anxiety and Bipolar Disorder who presented to ED with suicide ideation. Patient reports being depressed in the last 3 weeks with paranoia. CM asked if an incident occurred that caused patient to feel this way. Patient did not voice any specific concerns or reported any drastic changes. Patient feels that medications prescribed at [facility]. BECCA Loving(Date Unknown), are not working effectively. Patient is employed with the [employer] in [place], where he serves as a . Patient stated he will be insured beginning October 1st through his employer. Patient to provide policy information when available and he will be insured with [employer]. Patient confirmed he is a patient at [facility] and recently followed up with Dr. Shai Berger . Patient has a history of treatment with Marta Reynoso. [facility] [employer]. Dates unknown. Patient resides with roommates at [address] [place] [address], [address]  but confirmed address on file is his mother's residence. Patient reports having supportive family members such as his parents and sister. CM will continue to send referrals, for inpatient psychiatry care. Patient's mother present at bedside and updated. CM following. Update 2:48pm-  Referral faxed to [facility]. Connie Mendoza this afternoon. CM placed call to [address] and [facility] [phone], to obtain update regarding pending referral. Per Domingo March with [facility], [address] will need to re-fax referral. CM was receptive. Per Angela Leyva with [facility], referral will need to be re-faxed. CM will resend referrals to [facility] and [facility]. CM to follow up.

## 2022-09-28 NOTE — PROGRESS NOTES
Hospitalist Progress Note   Admit Date:  2022 10:54 PM   Name:  Zachery Corcoran   Age:  30 y.o. Sex:  male  :  1985   MRN:  [id number]   Room:  [facility]/    Presenting Complaint: Suicidal     Reason(s) for Admission: Suicide ideation [R45.851]  Bipolar 1 disorder ([address] [F31.9]  Accidental acetaminophen overdose, initial encounter [T39.1X1A]  Schizophrenia, unspecified type [facility] [F20.9]     Hospital Course:     Patient with past medical history of    Polysubstance abuse   Anxiety   Bipolar disorder     Patient presented to hospital with suicidal attempt. He planned to cut his wrist. He took undisclosed amount of Acetaminophen. He was found to have elevated liver enzymes. Patient is started on 20-hour course of Acetylcysteine since admission. Subjective & 24hr Events (22):     22   Patient has been involuntarily committed due to his psychiatric condition. Not deemed in need for one-on-one watch from his answering on screening tool. No suicidal thought now when I asked him. Assessment & Plan:     Principal Problem:    Suicide ideation    Suicide attempt ([address]  Bipolar I disorder, most recent episode depressed, severe psychosis ([facility]  Plan:   Patient is seen by psychiatrist who recommended the following medications; On Zyprexa 5 mg po hs   Cogentin 1 mg po bid PRN   Pending referral to psychiatric patient unit. Active Problems:    Tylenol toxicity, accidental or unintentional, initial encounter  Plan:   Patient completed Acetyl cysteine course   Monitor symptoms and LFT   LFT shows improvement of liver enzymes. Substance use disorder  Plan:   I advised patient to quit. Anticipated discharge needs:      Pending referral     Diet:  ADULT DIET;  Regular; Safety Tray; Safety Tray (Disposables)  DVT PPx: Enoxaparin SC   Code status: Full Code    Hospital Problems:  Principal Problem:    Suicide ideation  Active Problems:    Tylenol toxicity, accidental or unintentional, initial encounter    Substance use disorder    Suicide attempt ([facility]    Bipolar I disorder, most recent episode depressed, severe w psychosis ([facility]  Resolved Problems:    * No resolved hospital problems. *      Objective:   Patient Vitals for the past 24 hrs:   Temp Pulse Resp BP SpO2   09/28/22 0545 98.2 °F (36.8 °C) 96 18 (!) 131/90 100 %   09/27/22 2330 97.8 °F (36.6 °C) 95 20 129/84 100 %   09/27/22 1915 -- 78 18 (!) 135/98 100 %   09/27/22 1641 98.4 °F (36.9 °C) 65 18 (!) 144/94 100 %   09/27/22 1118 98.2 °F (36.8 °C) 83 16 (!) 139/93 100 %         Oxygen Therapy  SpO2: 100 %  O2 Device: None (Room air)    Estimated body mass index is 28.25 kg/m² as calculated from the following:    Height as of this encounter: 6' 2\" (1.88 m). Weight as of this encounter: 220 lb (99.8 kg). Intake/Output Summary (Last 24 hours) at 9/28/2022 1057  Last data filed at 9/27/2022 1330  Gross per 24 hour   Intake 240 ml   Output --   Net 240 ml           Physical Exam:     Blood pressure (!) 131/90, pulse 96, temperature 98.2 °F (36.8 °C), temperature source Oral, resp. rate 18, height 6' 2\" (1.88 m), weight 220 lb (99.8 kg), SpO2 100 %. General:    Well nourished. Patient is lying down in bed. Talking and answering well. Head:  Normocephalic, atraumatic  Eyes:  Sclerae appear normal.  Pupils equally round. ENT:  Nares appear normal, no drainage. Moist oral mucosa  Neck:  No restricted ROM. Trachea midline   CV:   RRR. No m/r/g. No jugular venous distension. Lungs:   CTAB. No wheezing, rhonchi, or rales. Symmetric expansion. Abdomen: Bowel sounds present. Soft, nontender, nondistended. Extremities: No cyanosis or clubbing. No edema  Skin:     No rashes and normal coloration. Warm and dry. Neuro:  CN II-XII grossly intact. Sensation intact. A&Ox3  Psych:  Normal mood and affect. Not suicidal today.      I have personally reviewed labs and tests showing:  Recent Labs:  Recent Results (from the past 48 hour(s))   Comprehensive Metabolic Panel w/ Reflex to MG    Collection Time: 09/27/22  9:16 AM   Result Value Ref Range    Sodium 138 138 - 145 mmol/L    Potassium 4.3 3.5 - 5.1 mmol/L    Chloride 108 101 - 110 mmol/L    CO2 28 21 - 32 mmol/L    Anion Gap 2 (L) 4 - 13 mmol/L    Glucose 116 (H) 65 - 100 mg/dL    BUN 12 6 - 23 MG/DL    Creatinine 0.90 0.8 - 1.5 MG/DL    GFR African American >60 >60 ml/min/1.73m2    GFR Non- >60 >60 ml/min/1.73m2    Calcium 8.9 8.3 - 10.4 MG/DL    Total Bilirubin 0.8 0.2 - 1.1 MG/DL     (H) 12 - 65 U/L    AST 33 15 - 37 U/L    Alk Phosphatase 80 50 - 136 U/L    Total Protein 6.8 6.3 - 8.2 g/dL    Albumin 3.0 (L) 3.5 - 5.0 g/dL    Globulin 3.8 (H) 2.3 - 3.5 g/dL    Albumin/Globulin Ratio 0.8 (L) 1.2 - 3.5     Comprehensive Metabolic Panel w/ Reflex to MG    Collection Time: 09/28/22  5:42 AM   Result Value Ref Range    Sodium 139 136 - 145 mmol/L    Potassium 5.0 3.5 - 5.1 mmol/L    Chloride 112 (H) 101 - 110 mmol/L    CO2 26 21 - 32 mmol/L    Anion Gap 1 (L) 4 - 13 mmol/L    Glucose 90 65 - 100 mg/dL    BUN 11 6 - 23 MG/DL    Creatinine 0.90 0.8 - 1.5 MG/DL    GFR African American >60 >60 ml/min/1.73m2    GFR Non- >60 >60 ml/min/1.73m2    Calcium 9.5 8.3 - 10.4 MG/DL    Total Bilirubin 0.6 0.2 - 1.1 MG/DL     (H) 12 - 65 U/L    AST 23 15 - 37 U/L    Alk Phosphatase 91 50 - 136 U/L    Total Protein 6.7 6.3 - 8.2 g/dL    Albumin 3.0 (L) 3.5 - 5.0 g/dL    Globulin 3.7 (H) 2.3 - 3.5 g/dL    Albumin/Globulin Ratio 0.8 (L) 1.2 - 3.5     Protime-INR    Collection Time: 09/28/22  5:42 AM   Result Value Ref Range    Protime 12.1 (L) 12.6 - 14.5 sec    INR 0.9         I have personally reviewed imaging studies showing:   Other Studies:  No orders to display       Current Meds:  Current Facility-Administered Medications   Medication Dose Route Frequency    zolpidem (AMBIEN) tablet 5 mg  5 mg Oral Nightly    ALPRAZolam (XANAX) tablet 2 mg  2 mg Oral TID PRN    sodium chloride flush 0.9 % injection 5-40 mL  5-40 mL IntraVENous 2 times per day    sodium chloride flush 0.9 % injection 5-40 mL  5-40 mL IntraVENous PRN    0.9 % sodium chloride infusion   IntraVENous PRN    enoxaparin (LOVENOX) injection 40 mg  40 mg SubCUTAneous Daily    ondansetron (ZOFRAN-ODT) disintegrating tablet 4 mg  4 mg Oral Q8H PRN    Or    ondansetron (ZOFRAN) injection 4 mg  4 mg IntraVENous Q6H PRN    polyethylene glycol (GLYCOLAX) packet 17 g  17 g Oral Daily PRN    nicotine (NICODERM CQ) 21 MG/24HR 1 patch  1 patch TransDERmal Daily    oxyCODONE (ROXICODONE) immediate release tablet 5 mg  5 mg Oral Q4H PRN    ibuprofen (ADVIL;MOTRIN) tablet 400 mg  400 mg Oral Q6H PRN       Signed:  Tellis Cushing, MD    Part of this note may have been written by using a voice dictation software. The note has been proof read but may still contain some grammatical/other typographical errors.

## 2022-09-29 LAB
EKG ATRIAL RATE: 82 BPM
EKG DIAGNOSIS: NORMAL
EKG P AXIS: 48 DEGREES
EKG P-R INTERVAL: 154 MS
EKG Q-T INTERVAL: 360 MS
EKG QRS DURATION: 102 MS
EKG QTC CALCULATION (BAZETT): 420 MS
EKG R AXIS: 74 DEGREES
EKG T AXIS: 20 DEGREES
EKG VENTRICULAR RATE: 82 BPM
MAGNESIUM SERPL-MCNC: 2.4 MG/DL (ref 1.8–2.4)

## 2022-09-29 PROCEDURE — 2580000003 HC RX 258: Performed by: INTERNAL MEDICINE

## 2022-09-29 PROCEDURE — 1100000000 HC RM PRIVATE

## 2022-09-29 PROCEDURE — 6370000000 HC RX 637 (ALT 250 FOR IP): Performed by: HOSPITALIST

## 2022-09-29 PROCEDURE — 93005 ELECTROCARDIOGRAM TRACING: CPT | Performed by: INTERNAL MEDICINE

## 2022-09-29 PROCEDURE — 83735 ASSAY OF MAGNESIUM: CPT

## 2022-09-29 PROCEDURE — 6360000002 HC RX W HCPCS: Performed by: INTERNAL MEDICINE

## 2022-09-29 PROCEDURE — 6370000000 HC RX 637 (ALT 250 FOR IP): Performed by: INTERNAL MEDICINE

## 2022-09-29 RX ORDER — ALPRAZOLAM 0.5 MG/1
1 TABLET ORAL EVERY 6 HOURS PRN
Status: DISCONTINUED | OUTPATIENT
Start: 2022-09-29 | End: 2022-10-03 | Stop reason: HOSPADM

## 2022-09-29 RX ORDER — DIAZEPAM 5 MG/ML
5 INJECTION, SOLUTION INTRAMUSCULAR; INTRAVENOUS ONCE
Status: COMPLETED | OUTPATIENT
Start: 2022-09-29 | End: 2022-09-29

## 2022-09-29 RX ADMIN — ALPRAZOLAM 2 MG: 0.5 TABLET ORAL at 09:06

## 2022-09-29 RX ADMIN — ALPRAZOLAM 1 MG: 0.5 TABLET ORAL at 15:55

## 2022-09-29 RX ADMIN — SODIUM CHLORIDE, PRESERVATIVE FREE 10 ML: 5 INJECTION INTRAVENOUS at 21:03

## 2022-09-29 RX ADMIN — DIAZEPAM 5 MG: 10 INJECTION, SOLUTION INTRAMUSCULAR; INTRAVENOUS at 17:41

## 2022-09-29 RX ADMIN — ALPRAZOLAM 1 MG: 0.5 TABLET ORAL at 21:03

## 2022-09-29 RX ADMIN — ENOXAPARIN SODIUM 40 MG: 100 INJECTION SUBCUTANEOUS at 09:08

## 2022-09-29 RX ADMIN — ZOLPIDEM TARTRATE 5 MG: 5 TABLET ORAL at 21:03

## 2022-09-29 NOTE — CARE COORDINATION
Chart reviewed by CM for continued stay. LOS 6 days. Patient remains hospitalized, pending psych placement. CM placed call to [facility], [address] [place] [address], and Alvin Watkins regarding psych referral.    [facility]- Per Phil Rounds will need to call back with insurance policy when available, to provide final determination regarding bed offer. [address] [place] [address] Per Eliud Cheung,  Patient denied by attending physician, due to acuity being too high. Caron Watkins - patient remains on waiting list.    CM will await insurance information to provide to [facility] and Whitney Watkins. CM continues to follow.

## 2022-09-29 NOTE — PROGRESS NOTES
Upon entering patient's room, it smelt like smoke. Pt admitted to smoking in room. Cigarettes and lighter removed from room. Placed in bag with patient name and placed in locked drawer.

## 2022-09-29 NOTE — PROGRESS NOTES
Hourly rounds performed this shift. Patient's BP and HR have been increased this shift. Patient reports being anxious. Patient denies needs at this time. Bed in low position. The call light and personal items are in reach. Will continue to monitor and report to oncoming nurse.

## 2022-09-29 NOTE — PROGRESS NOTES
Hospitalist Progress Note   Admit Date:  2022 10:54 PM   Name:  Loren Be   Age:  30 y.o. Sex:  male  :  1985   MRN:  [id number]   Room:  [facility]/    Presenting Complaint: Suicidal     Reason(s) for Admission: Suicide ideation [R45.851]  Bipolar 1 disorder ([facility] [address] [F31.9]  Accidental acetaminophen overdose, initial encounter [T39.1X1A]  Schizophrenia, unspecified type [facility] [F20.9]     Hospital Course:     Patient with past medical history of    Polysubstance abuse   Anxiety   Bipolar disorder     Patient presented to hospital with suicidal attempt. He planned to cut his wrist. He took undisclosed amount of Acetaminophen. He was found to have elevated liver enzymes. Patient is started on 20-hour course of Acetylcysteine since admission. Subjective & 24hr Events (22):     22   Patient has been involuntarily committed due to his psychiatric condition. Not deemed in need for one-on-one watch from his answering on screening tool. No suicidal thought now when I asked him. 22   Patient denies suicidal ideation now, but saying the thought can come back sometimes. Afebrile   Patient reports feeling anxious. He was found smoking inside the room. Assessment & Plan:     Principal Problem:    Suicide ideation    Suicide attempt ([facility]  Bipolar I disorder, most recent episode depressed, severe psychosis ([facility] [address]  Plan:   Patient is seen by psychiatrist who recommended the following medications; On Zyprexa 5 mg po hs   Cogentin 1 mg po bid PRN   Pending referral to psychiatric patient unit. Active Problems:    Tylenol toxicity, accidental or unintentional, initial encounter  Plan:   Patient completed Acetyl cysteine course   Monitor symptoms and LFT   LFT shows improvement of liver enzymes. Substance use disorder  Plan:   I advised patient to quit. Anticipated discharge needs:      Pending referral     Diet:  ADULT DIET;  Regular; Safety Tray; Safety Tray (Disposables)  DVT PPx: Enoxaparin SC   Code status: Full Code    Hospital Problems:  Principal Problem:    Suicide ideation  Active Problems:    Tylenol toxicity, accidental or unintentional, initial encounter    Substance use disorder    Suicide attempt ([facility] [address]    Bipolar I disorder, most recent episode depressed, severe w psychosis ([facility] [address]  Resolved Problems:    * No resolved hospital problems. *      Objective:   Patient Vitals for the past 24 hrs:   Temp Pulse Resp BP SpO2   09/29/22 1130 -- (!) 118 -- -- --   09/29/22 1125 98.8 °F (37.1 °C) (!) 160 20 (!) 126/92 100 %   09/29/22 0756 97.3 °F (36.3 °C) (!) 123 20 (!) 129/93 100 %   09/29/22 0334 97.8 °F (36.6 °C) 82 18 113/62 100 %   09/28/22 2342 98.6 °F (37 °C) (!) 112 20 (!) 113/94 99 %   09/28/22 1916 98.2 °F (36.8 °C) (!) 127 18 (!) 121/95 97 %   09/28/22 1518 98.1 °F (36.7 °C) (!) 146 22 (!) 143/101 99 %   09/28/22 1248 97.3 °F (36.3 °C) (!) 101 20 (!) 132/99 98 %         Oxygen Therapy  SpO2: 100 %  O2 Device: None (Room air)    Estimated body mass index is 28.25 kg/m² as calculated from the following:    Height as of this encounter: 6' 2\" (1.88 m). Weight as of this encounter: 220 lb (99.8 kg). No intake or output data in the 24 hours ending 09/29/22 1144        Physical Exam:     Blood pressure (!) 126/92, pulse (!) 118, temperature 98.8 °F (37.1 °C), temperature source Oral, resp. rate 20, height 6' 2\" (1.88 m), weight 220 lb (99.8 kg), SpO2 100 %. General:    Well nourished. Patient is lying down in bed. Talking and answering well. Head:  Normocephalic, atraumatic  Eyes:  Sclerae appear normal.  Pupils equally round. ENT:  Nares appear normal, no drainage. Moist oral mucosa  Neck:  No restricted ROM. Trachea midline   CV:   RRR. No m/r/g. No jugular venous distension. Lungs:   CTAB. No wheezing, rhonchi, or rales. Symmetric expansion. Abdomen: Bowel sounds present. Soft, nontender, nondistended.   Extremities: No cyanosis or clubbing. No edema  Skin:     No rashes and normal coloration. Warm and dry. Neuro:  CN II-XII grossly intact. Sensation intact. A&Ox3  Psych:  Normal mood and affect. Not suicidal today. I have personally reviewed labs and tests showing:  Recent Labs:  Recent Results (from the past 48 hour(s))   Comprehensive Metabolic Panel w/ Reflex to MG    Collection Time: 09/28/22  5:42 AM   Result Value Ref Range    Sodium 139 136 - 145 mmol/L    Potassium 5.0 3.5 - 5.1 mmol/L    Chloride 112 (H) 101 - 110 mmol/L    CO2 26 21 - 32 mmol/L    Anion Gap 1 (L) 4 - 13 mmol/L    Glucose 90 65 - 100 mg/dL    BUN 11 6 - 23 MG/DL    Creatinine 0.90 0.8 - 1.5 MG/DL    GFR African American >60 >60 ml/min/1.73m2    GFR Non- >60 >60 ml/min/1.73m2    Calcium 9.5 8.3 - 10.4 MG/DL    Total Bilirubin 0.6 0.2 - 1.1 MG/DL     (H) 12 - 65 U/L    AST 23 15 - 37 U/L    Alk Phosphatase 91 50 - 136 U/L    Total Protein 6.7 6.3 - 8.2 g/dL    Albumin 3.0 (L) 3.5 - 5.0 g/dL    Globulin 3.7 (H) 2.3 - 3.5 g/dL    Albumin/Globulin Ratio 0.8 (L) 1.2 - 3.5     Protime-INR    Collection Time: 09/28/22  5:42 AM   Result Value Ref Range    Protime 12.1 (L) 12.6 - 14.5 sec    INR 0.9         I have personally reviewed imaging studies showing:   Other Studies:  No orders to display       Current Meds:  Current Facility-Administered Medications   Medication Dose Route Frequency    ALPRAZolam (XANAX) tablet 1 mg  1 mg Oral Q6H PRN    zolpidem (AMBIEN) tablet 5 mg  5 mg Oral Nightly    ALPRAZolam (XANAX) tablet 2 mg  2 mg Oral TID PRN    sodium chloride flush 0.9 % injection 5-40 mL  5-40 mL IntraVENous 2 times per day    sodium chloride flush 0.9 % injection 5-40 mL  5-40 mL IntraVENous PRN    0.9 % sodium chloride infusion   IntraVENous PRN    enoxaparin (LOVENOX) injection 40 mg  40 mg SubCUTAneous Daily    ondansetron (ZOFRAN-ODT) disintegrating tablet 4 mg  4 mg Oral Q8H PRN    Or    ondansetron (ZOFRAN) injection 4 mg  4 mg IntraVENous Q6H PRN    polyethylene glycol (GLYCOLAX) packet 17 g  17 g Oral Daily PRN    nicotine (NICODERM CQ) 21 MG/24HR 1 patch  1 patch TransDERmal Daily    oxyCODONE (ROXICODONE) immediate release tablet 5 mg  5 mg Oral Q4H PRN    ibuprofen (ADVIL;MOTRIN) tablet 400 mg  400 mg Oral Q6H PRN       Signed:  Brisa Greenfield MD    Part of this note may have been written by using a voice dictation software. The note has been proof read but may still contain some grammatical/other typographical errors.

## 2022-09-29 NOTE — FLOWSHEET NOTE
09/29/22 1644 09/29/22 1800   Vital Signs   Temp 97.5 °F (36.4 °C) 97.7 °F (36.5 °C)   Temp Source Oral Oral   Heart Rate (!) 116 76   Heart Rate Source Monitor Monitor   Resp 18 16   BP (!) 133/108 123/80   BP Location Right upper arm Right upper arm   BP Method Automatic  --    MAP (Calculated) 116.33 94.33   Patient Position Sitting Sitting   Level of Consciousness 0  --    MEWS Score 3  --    Pain Assessment   Pain Assessment None - Denies Pain None - Denies Pain   Oxygen Therapy   SpO2 99 % 99 %       1644 Vitals prior to administration of Valium and 1800 vitals post Valium administration. Patient reports decreased anxiety post Valium.

## 2022-09-29 NOTE — PROGRESS NOTES
Pt's heart rate and bp elevated. Pt reports anxiety about discharge plan. Patient was smoking in room this morning while he had a nicotine patch on. Patient's HR currently 118. C. Nirav Lebron MD notified.

## 2022-09-30 PROCEDURE — 2580000003 HC RX 258: Performed by: INTERNAL MEDICINE

## 2022-09-30 PROCEDURE — 6370000000 HC RX 637 (ALT 250 FOR IP): Performed by: INTERNAL MEDICINE

## 2022-09-30 PROCEDURE — 6370000000 HC RX 637 (ALT 250 FOR IP): Performed by: HOSPITALIST

## 2022-09-30 PROCEDURE — 1100000000 HC RM PRIVATE

## 2022-09-30 PROCEDURE — 6360000002 HC RX W HCPCS: Performed by: INTERNAL MEDICINE

## 2022-09-30 RX ORDER — OLANZAPINE 2.5 MG/1
5 TABLET ORAL NIGHTLY
Status: DISCONTINUED | OUTPATIENT
Start: 2022-09-30 | End: 2022-10-03 | Stop reason: HOSPADM

## 2022-09-30 RX ORDER — BENZTROPINE MESYLATE 1 MG/1
1 TABLET ORAL 2 TIMES DAILY PRN
Status: DISCONTINUED | OUTPATIENT
Start: 2022-09-30 | End: 2022-10-03 | Stop reason: HOSPADM

## 2022-09-30 RX ADMIN — OLANZAPINE 5 MG: 2.5 TABLET, FILM COATED ORAL at 20:58

## 2022-09-30 RX ADMIN — SODIUM CHLORIDE, PRESERVATIVE FREE 10 ML: 5 INJECTION INTRAVENOUS at 09:02

## 2022-09-30 RX ADMIN — ENOXAPARIN SODIUM 40 MG: 100 INJECTION SUBCUTANEOUS at 08:59

## 2022-09-30 RX ADMIN — ALPRAZOLAM 1 MG: 0.5 TABLET ORAL at 07:36

## 2022-09-30 RX ADMIN — ZOLPIDEM TARTRATE 5 MG: 5 TABLET ORAL at 20:57

## 2022-09-30 RX ADMIN — SODIUM CHLORIDE, PRESERVATIVE FREE 10 ML: 5 INJECTION INTRAVENOUS at 20:58

## 2022-09-30 RX ADMIN — ALPRAZOLAM 1 MG: 0.5 TABLET ORAL at 20:57

## 2022-09-30 RX ADMIN — ALPRAZOLAM 1 MG: 0.5 TABLET ORAL at 15:08

## 2022-09-30 NOTE — PROGRESS NOTES
Hourly rounds in progress. Flat affect, depressed attitude. Low suicide risk at this time. Ambien and xanax given this shift. Appeared to rest well.

## 2022-09-30 NOTE — CARE COORDINATION
CM placed call to [facility] [phone], to determine if staff member can provide medication Paliperidone, as patient is due for next dose. CM left VM with nurse line, requesting call back at earliest convenience. Attending notified. CM awaits call back regarding this matter. CM continues to follow.

## 2022-09-30 NOTE — PROGRESS NOTES
Hospitalist Progress Note   Admit Date:  2022 10:54 PM   Name:  Taryn Barragan   Age:  30 y.o. Sex:  male  :  1985   MRN:  [id number]   Room:  [facility]/    Presenting Complaint: Suicidal     Reason(s) for Admission: Suicide ideation [R45.851]  Bipolar 1 disorder ([facility] [address] [F31.9]  Accidental acetaminophen overdose, initial encounter [T39.1X1A]  Schizophrenia, unspecified type [facility] [F20.9]     Hospital Course:     Patient with past medical history of    Polysubstance abuse   Anxiety   Bipolar disorder     Patient presented to hospital with suicidal attempt. He planned to cut his wrist. He took undisclosed amount of Acetaminophen. He was found to have elevated liver enzymes. Patient is started on 20-hour course of Acetylcysteine since admission. Subjective & 24hr Events (22):     22   Patient has been involuntarily committed due to his psychiatric condition. Not deemed in need for one-on-one watch from his answering on screening tool. No suicidal thought now when I asked him. 22   Patient denies suicidal ideation now, but saying the thought can come back sometimes. Afebrile   Patient reports feeling anxious. He was found smoking inside the room. 22   Patient's mother informed me that patient has been receiving Paliperidone injection every 3 months from a mental health clinic. It is listed in his home medication as every month, however. Our hospital does not carry this medication and  is working on getting it from a mental health clinic locally. Patient is resting in bed. No new complaints today. No suicidal idea today. Assessment & Plan:     Principal Problem:    Suicide ideation    Suicide attempt ([facility] [address]  Bipolar I disorder, most recent episode depressed, severe psychosis ([facility] [address]  Plan:   Patient is seen by psychiatrist who recommended the following medications;    On Zyprexa 5 mg po hs   Cogentin 1 mg po bid PRN   Pending referral to psychiatric patient unit. Pharmacy and  are helping to obtain Paliperidone IM injection for the patient. Patient is involuntarily committed. Active Problems:    Tylenol toxicity, accidental or unintentional, initial encounter  Plan:   Patient completed Acetyl cysteine course   Monitor symptoms and LFT   LFT shows improvement of liver enzymes. Substance use disorder  Plan:   I advised patient to quit. Anticipated discharge needs:      Pending referral     Diet:  ADULT DIET; Regular; Safety Tray; Safety Tray (Disposables)  DVT PPx: Enoxaparin SC   Code status: Full Code    Hospital Problems:  Principal Problem:    Suicide ideation  Active Problems:    Tylenol toxicity, accidental or unintentional, initial encounter    Substance use disorder    Suicide attempt ([facility] [address]    Bipolar I disorder, most recent episode depressed, severe w psychosis ([facility] [address]  Resolved Problems:    * No resolved hospital problems. *      Objective:   Patient Vitals for the past 24 hrs:   Temp Pulse Resp BP SpO2   09/30/22 0805 97.9 °F (36.6 °C) 74 18 114/71 98 %   09/30/22 0354 97.5 °F (36.4 °C) 76 20 111/70 98 %   09/29/22 2335 97.9 °F (36.6 °C) 74 18 117/70 96 %   09/29/22 1949 98.2 °F (36.8 °C) 90 20 113/74 98 %   09/29/22 1800 97.7 °F (36.5 °C) 76 16 123/80 99 %   09/29/22 1644 97.5 °F (36.4 °C) (!) 116 18 (!) 133/108 99 %         Oxygen Therapy  SpO2: 98 %  O2 Device: None (Room air)    Estimated body mass index is 28.25 kg/m² as calculated from the following:    Height as of this encounter: 6' 2\" (1.88 m). Weight as of this encounter: 220 lb (99.8 kg). Intake/Output Summary (Last 24 hours) at 9/30/2022 1133  Last data filed at 9/30/2022 0354  Gross per 24 hour   Intake 360 ml   Output --   Net 360 ml           Physical Exam:     Blood pressure 114/71, pulse 74, temperature 97.9 °F (36.6 °C), temperature source Oral, resp.  rate 18, height 6' 2\" (1.88 m), weight 220 lb (99.8 kg), SpO2 98 %.  General:    Well nourished. Patient is lying down in bed. Talking and answering well. Head:  Normocephalic, atraumatic  Eyes:  Sclerae appear normal.  Pupils equally round. ENT:  Nares appear normal, no drainage. Moist oral mucosa  Neck:  No restricted ROM. Trachea midline   CV:   RRR. No m/r/g. No jugular venous distension. Lungs:   CTAB. No wheezing, rhonchi, or rales. Symmetric expansion. Abdomen: Bowel sounds present. Soft, nontender, nondistended. Extremities: No cyanosis or clubbing. No edema  Skin:     No rashes and normal coloration. Warm and dry. Neuro:  CN II-XII grossly intact. Sensation intact. A&Ox3  Psych:  Normal mood and affect. Not suicidal today. I have personally reviewed labs and tests showing:  Recent Labs:  Recent Results (from the past 48 hour(s))   Magnesium    Collection Time: 09/29/22  5:42 AM   Result Value Ref Range    Magnesium 2.4 1.8 - 2.4 mg/dL   EKG 12 Lead    Collection Time: 09/29/22  6:12 PM   Result Value Ref Range    Ventricular Rate 82 BPM    Atrial Rate 82 BPM    P-R Interval 154 ms    QRS Duration 102 ms    Q-T Interval 360 ms    QTc Calculation (Bazett) 420 ms    P Axis 48 degrees    R Axis 74 degrees    T Axis 20 degrees    Diagnosis Normal sinus rhythm        I have personally reviewed imaging studies showing:   Other Studies:  No orders to display       Current Meds:  Current Facility-Administered Medications   Medication Dose Route Frequency    ALPRAZolam (XANAX) tablet 1 mg  1 mg Oral Q6H PRN    zolpidem (AMBIEN) tablet 5 mg  5 mg Oral Nightly    sodium chloride flush 0.9 % injection 5-40 mL  5-40 mL IntraVENous 2 times per day    sodium chloride flush 0.9 % injection 5-40 mL  5-40 mL IntraVENous PRN    0.9 % sodium chloride infusion   IntraVENous PRN    enoxaparin (LOVENOX) injection 40 mg  40 mg SubCUTAneous Daily    ondansetron (ZOFRAN-ODT) disintegrating tablet 4 mg  4 mg Oral Q8H PRN    Or    ondansetron (ZOFRAN) injection 4 mg  4 mg IntraVENous Q6H PRN    polyethylene glycol (GLYCOLAX) packet 17 g  17 g Oral Daily PRN    nicotine (NICODERM CQ) 21 MG/24HR 1 patch  1 patch TransDERmal Daily    oxyCODONE (ROXICODONE) immediate release tablet 5 mg  5 mg Oral Q4H PRN    ibuprofen (ADVIL;MOTRIN) tablet 400 mg  400 mg Oral Q6H PRN       Signed:  Gilbert Jha MD    Part of this note may have been written by using a voice dictation software. The note has been proof read but may still contain some grammatical/other typographical errors.

## 2022-09-30 NOTE — PROGRESS NOTES
Comprehensive Nutrition Assessment    Type and Reason for Visit: Initial, RD Nutrition Re-Screen/LOS  Length of Stay    Nutrition Recommendations/Plan:   Food and/or Nutrient Delivery: Continue Current Diet        Coordination of Nutrition Care: Continue to monitor while inpatient          Malnutrition Assessment:  Malnutrition Status: No malnutrition    Nutrition Assessment:  Nutrition History: 9/30: Eats 2 meals/d at baseline, not much of a breakfast eater. Has had some intentional weight loss this year. Nutrition Background:   H/O polysubstance abuse, anxiety, bipolar  Admitted post suicidal attempt  Nutrition Interval:  Pt reports no change in appetite or intake from baseline, he doesn't usually eat breakfast here but eats 100% of lunch and dinner. Current Nutrition Therapies:  ADULT DIET; Regular; Safety Tray; Safety Tray (Disposables)    Current Intake:   Average Meal Intake: 51-75% (100% of lunch and dinner per pt)        Anthropometric Measures:  Height: 6' 2\" (188 cm)  Current Body Wt: 220 lb (99.8 kg), Weight source: Not Specified  BMI: 28.2  Admission Body Weight: 220 lb (99.8 kg) (not specified)  Ideal Body Weight (Kg) (Calculated): 86 kg (190 lbs),    Usual Body Wt:  , Percent weight change:         Edema: No data recorded  BMI Category Overweight (BMI 25.0-29. 9)  Estimated Daily Nutrient Needs:  Energy (kcal/day): 5482-6021 (15-20 kcal/kg) (Kcal/kg Weight used: 99.8 kg Admission (Unspecifed)  Protein (g/day):  (0.8-1 g/kg) Weight Used: ( ) 99.8 kg  Fluid (ml/day):   (1 ml/kcal)    Nutrition Diagnosis:   No nutrition diagnosis at this time      Goals:       Active Goal:  (Continued intake >75% of estimated needs)       Nutrition Monitoring and Evaluation:      Food/Nutrient Intake Outcomes: Food and Nutrient Intake       Discharge Planning:    Continue current diet    Priscilla Azul RD,LD, [address] [facility]

## 2022-10-01 PROCEDURE — 2580000003 HC RX 258: Performed by: INTERNAL MEDICINE

## 2022-10-01 PROCEDURE — 6370000000 HC RX 637 (ALT 250 FOR IP): Performed by: HOSPITALIST

## 2022-10-01 PROCEDURE — 6370000000 HC RX 637 (ALT 250 FOR IP): Performed by: INTERNAL MEDICINE

## 2022-10-01 PROCEDURE — 6360000002 HC RX W HCPCS: Performed by: INTERNAL MEDICINE

## 2022-10-01 PROCEDURE — 1100000000 HC RM PRIVATE

## 2022-10-01 RX ADMIN — SODIUM CHLORIDE, PRESERVATIVE FREE 10 ML: 5 INJECTION INTRAVENOUS at 20:17

## 2022-10-01 RX ADMIN — ALPRAZOLAM 1 MG: 0.5 TABLET ORAL at 18:18

## 2022-10-01 RX ADMIN — ENOXAPARIN SODIUM 40 MG: 100 INJECTION SUBCUTANEOUS at 09:46

## 2022-10-01 RX ADMIN — SODIUM CHLORIDE, PRESERVATIVE FREE 10 ML: 5 INJECTION INTRAVENOUS at 09:00

## 2022-10-01 RX ADMIN — ALPRAZOLAM 1 MG: 0.5 TABLET ORAL at 09:54

## 2022-10-01 RX ADMIN — OLANZAPINE 5 MG: 2.5 TABLET, FILM COATED ORAL at 20:53

## 2022-10-01 RX ADMIN — ALPRAZOLAM 1 MG: 0.5 TABLET ORAL at 23:48

## 2022-10-01 RX ADMIN — ZOLPIDEM TARTRATE 5 MG: 5 TABLET ORAL at 20:53

## 2022-10-01 NOTE — PROGRESS NOTES
Hospitalist Progress Note   Admit Date:  2022 10:54 PM   Name:  Norma Vila   Age:  30 y.o. Sex:  male  :  1985   MRN:  [id number]   Room:  Replaced by [facility]/    Presenting Complaint: Suicidal     Reason(s) for Admission: Suicide ideation [R45.851]  Bipolar 1 disorder ([facility] [F31.9]  Accidental acetaminophen overdose, initial encounter [T39.1X1A]  Schizophrenia, unspecified type [facility] [F20.9]     Hospital Course:     Patient with past medical history of    Polysubstance abuse   Anxiety   Bipolar disorder     Patient presented to hospital with suicidal attempt. He planned to cut his wrist. He took undisclosed amount of Acetaminophen. He was found to have elevated liver enzymes. Patient is started on 20-hour course of Acetylcysteine since admission. Subjective & 24hr Events (10/01/22):     22   Patient has been involuntarily committed due to his psychiatric condition. Not deemed in need for one-on-one watch from his answering on screening tool. No suicidal thought now when I asked him. 22   Patient denies suicidal ideation now, but saying the thought can come back sometimes. Afebrile   Patient reports feeling anxious. He was found smoking inside the room. 22   Patient's mother informed me that patient has been receiving Paliperidone injection every 3 months from a mental health clinic. It is listed in his home medication as every month, however. Our hospital does not carry this medication and  is working on getting it from a mental health clinic locally. Patient is resting in bed. No new complaints today. No suicidal idea today. 10/1/22   Patient told me that he took Paliperidone injection every 3 months. He states that he is due for the next dose on 2022. Not suicidal today.          Assessment & Plan:     Principal Problem:    Suicide ideation    Suicide attempt ([facility]  Bipolar I disorder, most recent episode depressed, severe psychosis ([facility]  Plan:   Patient is seen by psychiatrist who recommended the following medications; On Zyprexa 5 mg po hs   Cogentin 1 mg po bid PRN   Pending referral to psychiatric patient unit. Pharmacy and  are helping to obtain Paliperidone IM injection for the patient. Patient is involuntarily committed. He is due for Paliperidone IM on October 13, 2022. He normally takes it every 3 months although his medication list says that he takes it every month. Active Problems:    Tylenol toxicity, accidental or unintentional, initial encounter  Plan:   Patient completed Acetylcysteine course   Monitor symptoms and LFT   LFT shows improvement of liver enzymes after treatment. Avoid Tylenol for mow. Substance use disorder  Plan:   I advised patient to quit. Anticipated discharge needs:      Pending referral     Diet:  ADULT DIET; Regular; Safety Tray; Safety Tray (Disposables)  DVT PPx: Enoxaparin SC   Code status: Full Code    Hospital Problems:  Principal Problem:    Suicide ideation  Active Problems:    Tylenol toxicity, accidental or unintentional, initial encounter    Substance use disorder    Suicide attempt ([facility]    Bipolar I disorder, most recent episode depressed, severe w psychosis ([facility]  Resolved Problems:    * No resolved hospital problems. *      Objective:   Patient Vitals for the past 24 hrs:   Temp Pulse Resp BP SpO2   10/01/22 0818 98.3 °F (36.8 °C) 80 18 108/75 100 %   10/01/22 0439 98.1 °F (36.7 °C) 66 20 95/71 98 %   10/01/22 0012 98.2 °F (36.8 °C) 77 18 115/75 95 %   09/30/22 2008 98.1 °F (36.7 °C) 76 20 113/77 99 %   09/30/22 1638 98.6 °F (37 °C) 74 18 114/78 98 %   09/30/22 1148 98.4 °F (36.9 °C) 82 18 102/80 99 %         Oxygen Therapy  SpO2: 100 %  O2 Device: None (Room air)    Estimated body mass index is 28.25 kg/m² as calculated from the following:    Height as of this encounter: 6' 2\" (1.88 m).     Weight as of this encounter: 220 lb (99.8 kg).    Intake/Output Summary (Last 24 hours) at 10/1/2022 0947  Last data filed at 9/30/2022 2008  Gross per 24 hour   Intake 300 ml   Output --   Net 300 ml           Physical Exam:     Blood pressure 108/75, pulse 80, temperature 98.3 °F (36.8 °C), temperature source Oral, resp. rate 18, height 6' 2\" (1.88 m), weight 220 lb (99.8 kg), SpO2 100 %. General:    Well nourished. Patient is lying down in bed. Talking and answering simple questions well. Patient does not seem to want to engage in conversation. Head:  Normocephalic, atraumatic  Eyes:  Sclerae appear normal.  Pupils equally round. ENT:  Nares appear normal, no drainage. Moist oral mucosa  Neck:  No restricted ROM. Trachea midline   CV:   RRR. No m/r/g. No jugular venous distension. Lungs:   CTAB. No wheezing, rhonchi, or rales. Symmetric expansion. Abdomen: Bowel sounds present. Soft, nontender, nondistended. Extremities: No cyanosis or clubbing. No edema  Skin:     No rashes and normal coloration. Warm and dry. Neuro:  CN II-XII grossly intact. Sensation intact. A&Ox3  Psych:  Normal mood and affect. Not suicidal today. I have personally reviewed labs and tests showing:  Recent Labs:  Recent Results (from the past 48 hour(s))   EKG 12 Lead    Collection Time: 09/29/22  6:12 PM   Result Value Ref Range    Ventricular Rate 82 BPM    Atrial Rate 82 BPM    P-R Interval 154 ms    QRS Duration 102 ms    Q-T Interval 360 ms    QTc Calculation (Bazett) 420 ms    P Axis 48 degrees    R Axis 74 degrees    T Axis 20 degrees    Diagnosis Normal sinus rhythm        I have personally reviewed imaging studies showing:   Other Studies:  No orders to display       Current Meds:  Current Facility-Administered Medications   Medication Dose Route Frequency    OLANZapine (ZYPREXA) tablet 5 mg  5 mg Oral Nightly    benztropine (COGENTIN) tablet 1 mg  1 mg Oral BID PRN    ALPRAZolam (XANAX) tablet 1 mg  1 mg Oral Q6H PRN    zolpidem (AMBIEN) tablet 5 mg  5 mg Oral Nightly    sodium chloride flush 0.9 % injection 5-40 mL  5-40 mL IntraVENous 2 times per day    sodium chloride flush 0.9 % injection 5-40 mL  5-40 mL IntraVENous PRN    0.9 % sodium chloride infusion   IntraVENous PRN    enoxaparin (LOVENOX) injection 40 mg  40 mg SubCUTAneous Daily    ondansetron (ZOFRAN-ODT) disintegrating tablet 4 mg  4 mg Oral Q8H PRN    Or    ondansetron (ZOFRAN) injection 4 mg  4 mg IntraVENous Q6H PRN    polyethylene glycol (GLYCOLAX) packet 17 g  17 g Oral Daily PRN    nicotine (NICODERM CQ) 21 MG/24HR 1 patch  1 patch TransDERmal Daily    oxyCODONE (ROXICODONE) immediate release tablet 5 mg  5 mg Oral Q4H PRN    ibuprofen (ADVIL;MOTRIN) tablet 400 mg  400 mg Oral Q6H PRN       Signed:  Will Presley MD    Part of this note may have been written by using a voice dictation software. The note has been proof read but may still contain some grammatical/other typographical errors.

## 2022-10-01 NOTE — CARE COORDINATION
Involuntary Commitment Papers renewed by Dr. Leonela Corey today. Papers to  10/4/2022. [facility] CM awaits insurance policy information, to assist with placement. Referral pending with [address] [place] [address]. CM remains available.

## 2022-10-01 NOTE — PROGRESS NOTES
Hourly rounds in progress. Confirms he is depressed but no suicide thoughts. Slept well after HS meds. Disoriented x 1 as he was in ware asking where the bathroom was. Reoriented quickly, voided and went back to sleep.

## 2022-10-02 VITALS
HEART RATE: 153 BPM | SYSTOLIC BLOOD PRESSURE: 135 MMHG | OXYGEN SATURATION: 100 % | HEIGHT: 74 IN | WEIGHT: 220 LBS | RESPIRATION RATE: 20 BRPM | BODY MASS INDEX: 28.23 KG/M2 | TEMPERATURE: 98.2 F | DIASTOLIC BLOOD PRESSURE: 97 MMHG

## 2022-10-02 PROBLEM — T39.1X1A TYLENOL TOXICITY, ACCIDENTAL OR UNINTENTIONAL, INITIAL ENCOUNTER: Status: RESOLVED | Noted: 2022-09-23 | Resolved: 2022-10-02

## 2022-10-02 LAB
ALBUMIN SERPL-MCNC: 3.3 G/DL (ref 3.5–5)
ALBUMIN/GLOB SERPL: 0.8 {RATIO} (ref 1.2–3.5)
ALP SERPL-CCNC: 88 U/L (ref 50–136)
ALT SERPL-CCNC: 114 U/L (ref 12–65)
ANION GAP SERPL CALC-SCNC: 5 MMOL/L (ref 4–13)
AST SERPL-CCNC: 19 U/L (ref 15–37)
BASOPHILS # BLD: 0.1 K/UL (ref 0–0.2)
BASOPHILS NFR BLD: 1 % (ref 0–2)
BILIRUB SERPL-MCNC: 0.4 MG/DL (ref 0.2–1.1)
BUN SERPL-MCNC: 11 MG/DL (ref 6–23)
CALCIUM SERPL-MCNC: 9.4 MG/DL (ref 8.3–10.4)
CHLORIDE SERPL-SCNC: 106 MMOL/L (ref 101–110)
CO2 SERPL-SCNC: 28 MMOL/L (ref 21–32)
CREAT SERPL-MCNC: 1.1 MG/DL (ref 0.8–1.5)
DIFFERENTIAL METHOD BLD: ABNORMAL
EOSINOPHIL # BLD: 0.2 K/UL (ref 0–0.8)
EOSINOPHIL NFR BLD: 3 % (ref 0.5–7.8)
ERYTHROCYTE [DISTWIDTH] IN BLOOD BY AUTOMATED COUNT: 13.2 % (ref 11.9–14.6)
GLOBULIN SER CALC-MCNC: 4.1 G/DL (ref 2.3–3.5)
GLUCOSE SERPL-MCNC: 92 MG/DL (ref 65–100)
HCT VFR BLD AUTO: 40.5 % (ref 41.1–50.3)
HGB BLD-MCNC: 12.4 G/DL (ref 13.6–17.2)
IMM GRANULOCYTES # BLD AUTO: 0.1 K/UL (ref 0–0.5)
IMM GRANULOCYTES NFR BLD AUTO: 2 % (ref 0–5)
INR PPP: 0.9
LYMPHOCYTES # BLD: 2.1 K/UL (ref 0.5–4.6)
LYMPHOCYTES NFR BLD: 31 % (ref 13–44)
MCH RBC QN AUTO: 28.7 PG (ref 26.1–32.9)
MCHC RBC AUTO-ENTMCNC: 30.6 G/DL (ref 31.4–35)
MCV RBC AUTO: 93.8 FL (ref 79.6–97.8)
MONOCYTES # BLD: 1 K/UL (ref 0.1–1.3)
MONOCYTES NFR BLD: 14 % (ref 4–12)
NEUTS SEG # BLD: 3.4 K/UL (ref 1.7–8.2)
NEUTS SEG NFR BLD: 50 % (ref 43–78)
NRBC # BLD: 0 K/UL (ref 0–0.2)
PLATELET # BLD AUTO: 337 K/UL (ref 150–450)
PMV BLD AUTO: 10.1 FL (ref 9.4–12.3)
POTASSIUM SERPL-SCNC: 4.4 MMOL/L (ref 3.5–5.1)
PROT SERPL-MCNC: 7.4 G/DL (ref 6.3–8.2)
PROTHROMBIN TIME: 12.1 SEC (ref 12.6–14.5)
RBC # BLD AUTO: 4.32 M/UL (ref 4.23–5.6)
SARS-COV-2 RDRP RESP QL NAA+PROBE: NOT DETECTED
SODIUM SERPL-SCNC: 139 MMOL/L (ref 138–145)
SOURCE: NORMAL
WBC # BLD AUTO: 6.9 K/UL (ref 4.3–11.1)

## 2022-10-02 PROCEDURE — 2580000003 HC RX 258: Performed by: INTERNAL MEDICINE

## 2022-10-02 PROCEDURE — 80053 COMPREHEN METABOLIC PANEL: CPT

## 2022-10-02 PROCEDURE — 6360000002 HC RX W HCPCS: Performed by: INTERNAL MEDICINE

## 2022-10-02 PROCEDURE — 87635 SARS-COV-2 COVID-19 AMP PRB: CPT

## 2022-10-02 PROCEDURE — 6370000000 HC RX 637 (ALT 250 FOR IP): Performed by: INTERNAL MEDICINE

## 2022-10-02 PROCEDURE — 36415 COLL VENOUS BLD VENIPUNCTURE: CPT

## 2022-10-02 PROCEDURE — 85025 COMPLETE CBC W/AUTO DIFF WBC: CPT

## 2022-10-02 PROCEDURE — 85610 PROTHROMBIN TIME: CPT

## 2022-10-02 RX ORDER — ZOLPIDEM TARTRATE 5 MG/1
5 TABLET ORAL NIGHTLY PRN
Qty: 3 TABLET | Refills: 0
Start: 2022-10-02 | End: 2022-10-05

## 2022-10-02 RX ORDER — OXYCODONE HYDROCHLORIDE 5 MG/1
5 TABLET ORAL EVERY 6 HOURS PRN
Qty: 3 TABLET | Refills: 0
Start: 2022-10-02 | End: 2022-10-03

## 2022-10-02 RX ORDER — OLANZAPINE 5 MG/1
5 TABLET ORAL NIGHTLY
Qty: 30 TABLET | Refills: 0
Start: 2022-10-02

## 2022-10-02 RX ORDER — NICOTINE 21 MG/24HR
1 PATCH, TRANSDERMAL 24 HOURS TRANSDERMAL DAILY
Qty: 30 PATCH | Refills: 0
Start: 2022-10-03

## 2022-10-02 RX ORDER — ALPRAZOLAM 1 MG/1
1 TABLET ORAL EVERY 6 HOURS PRN
Qty: 3 TABLET | Refills: 0
Start: 2022-10-02 | End: 2022-11-01

## 2022-10-02 RX ADMIN — SODIUM CHLORIDE, PRESERVATIVE FREE 10 ML: 5 INJECTION INTRAVENOUS at 08:21

## 2022-10-02 RX ADMIN — ALPRAZOLAM 1 MG: 0.5 TABLET ORAL at 19:44

## 2022-10-02 RX ADMIN — ENOXAPARIN SODIUM 40 MG: 100 INJECTION SUBCUTANEOUS at 08:18

## 2022-10-02 RX ADMIN — ALPRAZOLAM 1 MG: 0.5 TABLET ORAL at 14:53

## 2022-10-02 RX ADMIN — ALPRAZOLAM 1 MG: 0.5 TABLET ORAL at 08:18

## 2022-10-02 NOTE — CARE COORDINATION
CM received copy of patient's insurance card. CM to update Admitting/Financial Dept, on Monday 10/3. CM faxed updated clinicals/labs to [facility]. Pending physician review.

## 2022-10-02 NOTE — PROGRESS NOTES
Hospitalist Progress Note   Admit Date:  2022 10:54 PM   Name:  Chidi Kim   Age:  30 y.o. Sex:  male  :  1985   MRN:  [id number]   Room:  4/01    Presenting Complaint: Suicidal     Reason(s) for Admission: Suicide ideation [R45.851]  Bipolar 1 disorder ([address] [F31.9]  Accidental acetaminophen overdose, initial encounter [T39.1X1A]  Schizophrenia, unspecified type [facility]) [F20.9]     Hospital Course:     Mr. Zahira Ojeda is a 29 yo male with PMH of   Polysubstance abuse, anxiety, bipolar disorder admitted s/p suicidal attempt with attempt to cut wrists and taking tylenol overdose. LFTS elevated. S/p course of acetylcysteine. Seen by tele psychiatry and on IVC papers. Pending inpatient psychiatric placement. Subjective & 24hr Events (10/02/22): Still wants to go to inpatient rehab, still has negative thoughts,  asking for increase of ambien, poor sleep, ate ok, no BM change      Assessment & Plan:     Principal Problem:    Suicide ideation  Plan:   Bipolar I disorder, most recent episode depressed, severe w psychosis ([facility]  Plan:   Re consult tele psychiatry   On IVC papers   On zyprexa  Pending home meds being available - paliperidone IM      Active Problems:    Tylenol toxicity, accidental or unintentional, initial encounter  Plan:   S/p acetylcysteine   Trend LFTS      Substance use disorder  Plan:   Needs cessation                 Anticipated discharge needs:      Pending     Diet:  ADULT DIET; Regular; Safety Tray; Safety Tray (Disposables)  DVT PPx: lovneox  Code status: Full Code    Hospital Problems:  Principal Problem:    Suicide ideation  Active Problems:    Tylenol toxicity, accidental or unintentional, initial encounter    Substance use disorder    Suicide attempt ([address]    Bipolar I disorder, most recent episode depressed, severe w psychosis ([facility]  Resolved Problems:    * No resolved hospital problems.  *      Objective:   Patient Vitals for the past 24 hrs: Temp Pulse Resp BP SpO2   10/02/22 1330 97.3 °F (36.3 °C) 88 18 116/80 100 %   10/02/22 0714 98.1 °F (36.7 °C) 74 20 99/82 97 %   10/02/22 0443 97.9 °F (36.6 °C) 70 18 115/80 99 %   10/01/22 2344 97.2 °F (36.2 °C) 82 20 125/86 99 %   10/01/22 2021 97.7 °F (36.5 °C) 67 18 116/77 99 %   10/01/22 1625 98.4 °F (36.9 °C) 74 18 128/81 99 %       Oxygen Therapy  SpO2: 100 %  O2 Device: None (Room air)    Estimated body mass index is 28.25 kg/m² as calculated from the following:    Height as of this encounter: 6' 2\" (1.88 m). Weight as of this encounter: 220 lb (99.8 kg). Intake/Output Summary (Last 24 hours) at 10/2/2022 1354  Last data filed at 10/1/2022 2344  Gross per 24 hour   Intake 450 ml   Output --   Net 450 ml         Physical Exam:     Blood pressure 116/80, pulse 88, temperature 97.3 °F (36.3 °C), temperature source Oral, resp. rate 18, height 6' 2\" (1.88 m), weight 220 lb (99.8 kg), SpO2 100 %. General:    Well nourished. Alert, no distress   CV:   RRR. No m/r/g. No jugular venous distension. No edema   Lungs:   CTAB. No wheezing, rhonchi, or rales. Symmetric expansion. Abdomen: Bowel sounds present. Soft, nontender, nondistended. Neuro:  grossly intact. Psych:  Normal mood and affect. I have personally reviewed labs and tests showing:  Recent Labs:  No results found for this or any previous visit (from the past 48 hour(s)). I have personally reviewed imaging studies showing:   Other Studies:  No orders to display       Current Meds:  Current Facility-Administered Medications   Medication Dose Route Frequency    OLANZapine (ZYPREXA) tablet 5 mg  5 mg Oral Nightly    benztropine (COGENTIN) tablet 1 mg  1 mg Oral BID PRN    ALPRAZolam (XANAX) tablet 1 mg  1 mg Oral Q6H PRN    zolpidem (AMBIEN) tablet 5 mg  5 mg Oral Nightly    sodium chloride flush 0.9 % injection 5-40 mL  5-40 mL IntraVENous 2 times per day    sodium chloride flush 0.9 % injection 5-40 mL  5-40 mL IntraVENous PRN 0.9 % sodium chloride infusion   IntraVENous PRN    enoxaparin (LOVENOX) injection 40 mg  40 mg SubCUTAneous Daily    ondansetron (ZOFRAN-ODT) disintegrating tablet 4 mg  4 mg Oral Q8H PRN    Or    ondansetron (ZOFRAN) injection 4 mg  4 mg IntraVENous Q6H PRN    polyethylene glycol (GLYCOLAX) packet 17 g  17 g Oral Daily PRN    nicotine (NICODERM CQ) 21 MG/24HR 1 patch  1 patch TransDERmal Daily    oxyCODONE (ROXICODONE) immediate release tablet 5 mg  5 mg Oral Q4H PRN       Signed:  Akira Little MD    Part of this note may have been written by using a voice dictation software. The note has been proof read but may still contain some grammatical/other typographical errors.

## 2022-10-02 NOTE — DISCHARGE SUMMARY
Hospitalist Discharge Summary   Admit Date:  2022 10:54 PM   DC Note date: 10/2/2022  Name:  Zachery Corcoran   Age:  30 y.o. Sex:  male  :  1985   MRN:  [id number]   Room:  [facility]  PCP:  Monisha Rodriguez MD    Presenting Complaint: Suicidal     Initial Admission Diagnosis: Suicide ideation [R45.851]  Bipolar 1 disorder ([facility] [address] [F31.9]  Accidental acetaminophen overdose, initial encounter [T39.1X1A]  Schizophrenia, unspecified type ([facility] [address] [F20.9]     Problem List for this Hospitalization (present on admission):    Principal Problem:    Suicide ideation  Active Problems:    Substance use disorder    [facility]    Bipolar I disorder, most recent episode depressed, severe w psychosis ([facility]  Resolved Problems:    Tylenol toxicity, accidental or unintentional, initial encounter      Hospital Course:    Mr. Cass Samuel is a 31 yo male with PMH of   Polysubstance abuse, anxiety, bipolar disorder admitted s/p suicidal attempt with attempt to cut wrists and taking tylenol overdose. LFTS elevated but improved. S/p course of acetylcysteine. Seen by tele psychiatry and on IVC papers. He has been Pending inpatient psychiatric placement and has been accepted to [facility]. Disposition: inpatient psychiatry       Diet: ADULT DIET; Regular; Safety Tray; Safety Tray (Disposables)  Code Status: Full Code    Follow Ups: psychiatry       Time spent in patient discharge and coordination 35  minutes. Follow up labs/diagnostics (ultimately defer to outpatient provider):  Liver enzymes        Current Discharge Medication List        START taking these medications    Details   oxyCODONE (ROXICODONE) 5 MG immediate release tablet Take 1 tablet by mouth every 6 hours as needed for Pain for up to 3 doses.   Qty: 3 tablet, Refills: 0    Comments: Reduce doses taken as pain becomes manageable  Associated Diagnoses: Substance use disorder      OLANZapine (ZYPREXA) 5 MG tablet Take 1 tablet by mouth nightly  Qty: 30 tablet, Refills: 0      nicotine (NICODERM CQ) 21 MG/24HR Place 1 patch onto the skin daily  Qty: 30 patch, Refills: 0           CONTINUE these medications which have CHANGED    Details   ALPRAZolam (XANAX) 1 MG tablet Take 1 tablet by mouth every 6 hours as needed for Anxiety for up to 30 days. Qty: 3 tablet, Refills: 0    Associated Diagnoses: Substance use disorder      zolpidem (AMBIEN) 5 MG tablet Take 1 tablet by mouth nightly as needed for Sleep for up to 3 doses. Qty: 3 tablet, Refills: 0    Associated Diagnoses: Substance use disorder           STOP taking these medications       HYDROcodone-acetaminophen (NORCO)  MG per tablet Comments:   Reason for Stopping:         paliperidone palmitate ER (INVEGA SUSTENNA) 234 MG/1.5ML JUAN C IM injection Comments:   Reason for Stopping:               Procedures done this admission:  * No surgery found *    Consults this admission:  IP CONSULT TO PSYCHIATRY  IP CONSULT TO PHARMACY  IP CONSULT TO PSYCHIATRY    Echocardiogram results:  No results found for this or any previous visit. Diagnostic Imaging/Tests:   No results found. Labs: Results:       BMP, Mg, Phos No results for input(s): NA, K, CL, CO2, ANIONGAP, BUN, CREATININE, LABGLOM, GFRAA, CALCIUM, GLUCOSE, MG, PHOS in the last 72 hours. CBC No results for input(s): WBC, RBC, HGB, HCT, MCV, MCH, MCHC, RDW, PLT, MPV, NRBC, SEGS, LYMPHOPCT, EOSRELPCT, MONOPCT, BASOPCT, IMMGRAN, SEGSABS, LYMPHSABS, EOSABS, MONOSABS, BASOSABS, ABSIMMGRAN in the last 72 hours. LFT No results for input(s): BILITOT, BILIDIR, ALKPHOS, AST, ALT, PROT, LABALBU, GLOB in the last 72 hours.    Cardiac  No results found for: NTPROBNP, TROPHS   Coags Lab Results   Component Value Date/Time    PROTIME 12.1 09/28/2022 05:42 AM    INR 0.9 09/28/2022 05:42 AM      A1c No results found for: LABA1C, EAG   Lipids Lab Results   Component Value Date/Time    CHOL 121 04/12/2022 11:50 AM    LDLCALC 76 04/12/2022 11:50 AM HDL 32 04/12/2022 11:50 AM    TRIG 58 04/12/2022 11:50 AM      Thyroid  No results found for: Malina Davison     Most Recent UA Lab Results   Component Value Date/Time    COLORU YELLOW 04/05/2021 03:17 AM    SPECGRAV 1.012 04/05/2021 03:17 AM    PROTEINU Negative 04/05/2021 03:17 AM    GLUCOSEU Negative 04/05/2021 03:17 AM    KETUA Negative 04/05/2021 03:17 AM    BILIRUBINUR Negative 04/05/2021 03:17 AM    BLOODU Negative 04/05/2021 03:17 AM    NITRU Negative 04/05/2021 03:17 AM    LEUKOCYTESUR Negative 04/05/2021 03:17 AM        No results for input(s): CULTURE in the last 720 hours. All Labs from Last 24 Hrs:  No results found for this or any previous visit (from the past 24 hour(s)). Allergies   Allergen Reactions    Aspirin Hives     Immunization History   Administered Date(s) Administered    Influenza Virus Vaccine 10/24/2018       Recent Vital Data:  Patient Vitals for the past 24 hrs:   Temp Pulse Resp BP SpO2   10/02/22 1509 98.2 °F (36.8 °C) 72 20 118/76 99 %   10/02/22 1330 97.3 °F (36.3 °C) 88 18 116/80 100 %   10/02/22 0714 98.1 °F (36.7 °C) 74 20 99/82 97 %   10/02/22 0443 97.9 °F (36.6 °C) 70 18 115/80 99 %   10/01/22 2344 97.2 °F (36.2 °C) 82 20 125/86 99 %   10/01/22 2021 97.7 °F (36.5 °C) 67 18 116/77 99 %   10/01/22 1625 98.4 °F (36.9 °C) 74 18 128/81 99 %       Oxygen Therapy  SpO2: 99 %  O2 Device: None (Room air)    Estimated body mass index is 28.25 kg/m² as calculated from the following:    Height as of this encounter: 6' 2\" (1.88 m). Weight as of this encounter: 220 lb (99.8 kg). Intake/Output Summary (Last 24 hours) at 10/2/2022 1545  Last data filed at 10/1/2022 2344  Gross per 24 hour   Intake 450 ml   Output --   Net 450 ml         Physical Exam:    General:    Well nourished. No overt distress  CV:   RRR. No m/r/g. No JVD, no edema   Lungs:   CTAB. No wheezing, rhonchi, or rales. Respirations even, unlabored  Abdomen:   Soft, nontender, nondistended. Extremities: Warm and dry. No cyanosis or clubbing. No edema. Skin:     No rashes. Normal coloration  Neuro:  grossly intact. Psych:  Normal mood and affect. Signed:  Stacey Jules MD    Part of this note may have been written by using a voice dictation software. The note has been proof read but may still contain some grammatical/other typographical errors.

## 2022-10-03 ENCOUNTER — CARE COORDINATION (OUTPATIENT)
Dept: CARE COORDINATION | Facility: CLINIC | Age: 37
End: 2022-10-03

## 2022-10-03 NOTE — CARE COORDINATION
Transition of care outreached postponed for 14 days due to patients discharge to inpatient psychiatric facility.

## 2022-10-03 NOTE — PROGRESS NOTES
TRANSFER - OUT REPORT:    Verbal report given to [facility] FOR [facility] on Zachery Corcoran  being transferred to [facility] for urgent transfer       Report consisted of patient's Situation, Background, Assessment and   Recommendations(SBAR). Information from the following report(s) Nurse Handoff Report was reviewed with the receiving nurse. Lines: No lines  Peripheral IV 09/29/22 Right Hand (Active)   Site Assessment Clean, dry & intact 10/02/22 1507   Line Status Capped 10/02/22 0720   Line Care Connections checked and tightened 10/01/22 2357   Phlebitis Assessment No symptoms 10/02/22 1507   Infiltration Assessment 0 10/02/22 1507   Alcohol Cap Used Yes 10/01/22 2357   Dressing Status Clean, dry & intact 10/02/22 1507   Dressing Type Transparent 10/02/22 0720        Opportunity for questions and clarification was provided.       Patient transported with:  Christinia Cabot

## 2022-10-03 NOTE — CARE COORDINATION
Late Entry 10/2 : Patient accepted by Dr. Christa Galaviz at [facility]. Patient discharged to [facility] on 10/2 via [address]. Report line was given to primary nurse. ( [phone] unit 5) Patient's Mother Johann Rodriguez was notified of transfer. 09/23/22 2402   Service Assessment   Patient Orientation Unable to Assess   History Provided By Medical Record   Support Systems Family Members   Prior Functional Level Independent in ADLs/IADLs   Current Functional Level Independent in ADLs/IADLs   Can patient return to prior living arrangement Unknown at present   Ability to make needs known: [facility]   CM/SW Referral Psychiatry   Discharge Planning   Type of Residence House   Living Arrangements Friends   Current Services Prior To Admission None   Potential Assistance Needed Other (Comment)  (mental health services)   Type of [address] None   Patient expects to be discharged to:  Other (comment)  [facility])   Services At/After Discharge   Transition of Care Consult (CM Consult) Discharge Planning   Services At/After Discharge   [facility])   Mode of Transport at Discharge BLS  ([address]   Confirm Follow Up Transport Family

## 2022-10-14 ENCOUNTER — CARE COORDINATION (OUTPATIENT)
Dept: CARE COORDINATION | Facility: CLINIC | Age: 37
End: 2022-10-14

## 2022-10-14 NOTE — CARE COORDINATION
This Care Coordinator spoke with patient's family and the family states patient was Transferred from the [facility] to a Drug Rehab called [facility]. Episode resolved.

## 2024-03-19 ENCOUNTER — HOSPITAL ENCOUNTER (EMERGENCY)
Age: 39
Discharge: HOME | End: 2024-03-19
Attending: EMERGENCY MEDICINE
Payer: COMMERCIAL

## 2024-03-19 ENCOUNTER — OFFICE VISIT (OUTPATIENT)
Dept: FAMILY MEDICINE CLINIC | Facility: CLINIC | Age: 39
End: 2024-03-19
Payer: COMMERCIAL

## 2024-03-19 VITALS
BODY MASS INDEX: 36.51 KG/M2 | HEART RATE: 98 BPM | HEIGHT: 70 IN | WEIGHT: 255 LBS | TEMPERATURE: 97.9 F | DIASTOLIC BLOOD PRESSURE: 80 MMHG | SYSTOLIC BLOOD PRESSURE: 130 MMHG | OXYGEN SATURATION: 97 %

## 2024-03-19 VITALS
SYSTOLIC BLOOD PRESSURE: 145 MMHG | DIASTOLIC BLOOD PRESSURE: 82 MMHG | HEIGHT: 70 IN | OXYGEN SATURATION: 98 % | WEIGHT: 220 LBS | RESPIRATION RATE: 16 BRPM | BODY MASS INDEX: 31.5 KG/M2 | TEMPERATURE: 98.3 F | HEART RATE: 88 BPM

## 2024-03-19 DIAGNOSIS — F99 INSOMNIA DUE TO OTHER MENTAL DISORDER: Primary | ICD-10-CM

## 2024-03-19 DIAGNOSIS — F20.0 PARANOID SCHIZOPHRENIA (HCC): ICD-10-CM

## 2024-03-19 DIAGNOSIS — F51.05 INSOMNIA DUE TO OTHER MENTAL DISORDER: Primary | ICD-10-CM

## 2024-03-19 DIAGNOSIS — F22: ICD-10-CM

## 2024-03-19 DIAGNOSIS — F41.1 ANXIETY STATE: Primary | ICD-10-CM

## 2024-03-19 PROCEDURE — 2580000003 HC RX 258: Performed by: EMERGENCY MEDICINE

## 2024-03-19 PROCEDURE — 99284 EMERGENCY DEPT VISIT MOD MDM: CPT

## 2024-03-19 PROCEDURE — 6360000002 HC RX W HCPCS: Performed by: EMERGENCY MEDICINE

## 2024-03-19 PROCEDURE — 99213 OFFICE O/P EST LOW 20 MIN: CPT | Performed by: FAMILY MEDICINE

## 2024-03-19 PROCEDURE — 96372 THER/PROPH/DIAG INJ SC/IM: CPT

## 2024-03-19 RX ORDER — ALPRAZOLAM 1 MG/1
1 TABLET ORAL NIGHTLY PRN
Qty: 30 TABLET | Refills: 1 | Status: SHIPPED | OUTPATIENT
Start: 2024-03-19 | End: 2024-05-18

## 2024-03-19 RX ORDER — QUETIAPINE FUMARATE 50 MG/1
50 TABLET, FILM COATED ORAL NIGHTLY
Qty: 30 TABLET | Refills: 0 | Status: CANCELLED | OUTPATIENT
Start: 2024-03-19

## 2024-03-19 RX ORDER — PALIPERIDONE 6 MG/1
6 TABLET, EXTENDED RELEASE ORAL EVERY MORNING
Qty: 30 TABLET | Refills: 3 | Status: CANCELLED | OUTPATIENT
Start: 2024-03-19

## 2024-03-19 RX ADMIN — ZIPRASIDONE MESYLATE 20 MG: 20 INJECTION, POWDER, LYOPHILIZED, FOR SOLUTION INTRAMUSCULAR at 03:18

## 2024-03-19 SDOH — ECONOMIC STABILITY: HOUSING INSECURITY
IN THE LAST 12 MONTHS, WAS THERE A TIME WHEN YOU DID NOT HAVE A STEADY PLACE TO SLEEP OR SLEPT IN A SHELTER (INCLUDING NOW)?: PATIENT DECLINED

## 2024-03-19 SDOH — ECONOMIC STABILITY: FOOD INSECURITY: WITHIN THE PAST 12 MONTHS, THE FOOD YOU BOUGHT JUST DIDN'T LAST AND YOU DIDN'T HAVE MONEY TO GET MORE.: PATIENT DECLINED

## 2024-03-19 SDOH — ECONOMIC STABILITY: INCOME INSECURITY: HOW HARD IS IT FOR YOU TO PAY FOR THE VERY BASICS LIKE FOOD, HOUSING, MEDICAL CARE, AND HEATING?: PATIENT DECLINED

## 2024-03-19 ASSESSMENT — PATIENT HEALTH QUESTIONNAIRE - PHQ9
5. POOR APPETITE OR OVEREATING: NOT AT ALL
1. LITTLE INTEREST OR PLEASURE IN DOING THINGS: NOT AT ALL
10. IF YOU CHECKED OFF ANY PROBLEMS, HOW DIFFICULT HAVE THESE PROBLEMS MADE IT FOR YOU TO DO YOUR WORK, TAKE CARE OF THINGS AT HOME, OR GET ALONG WITH OTHER PEOPLE: NOT DIFFICULT AT ALL
6. FEELING BAD ABOUT YOURSELF - OR THAT YOU ARE A FAILURE OR HAVE LET YOURSELF OR YOUR FAMILY DOWN: NOT AT ALL
SUM OF ALL RESPONSES TO PHQ9 QUESTIONS 1 & 2: 0
SUM OF ALL RESPONSES TO PHQ QUESTIONS 1-9: 3
3. TROUBLE FALLING OR STAYING ASLEEP: NEARLY EVERY DAY
8. MOVING OR SPEAKING SO SLOWLY THAT OTHER PEOPLE COULD HAVE NOTICED. OR THE OPPOSITE, BEING SO FIGETY OR RESTLESS THAT YOU HAVE BEEN MOVING AROUND A LOT MORE THAN USUAL: NOT AT ALL
4. FEELING TIRED OR HAVING LITTLE ENERGY: NOT AT ALL
SUM OF ALL RESPONSES TO PHQ QUESTIONS 1-9: 3
9. THOUGHTS THAT YOU WOULD BE BETTER OFF DEAD, OR OF HURTING YOURSELF: NOT AT ALL
7. TROUBLE CONCENTRATING ON THINGS, SUCH AS READING THE NEWSPAPER OR WATCHING TELEVISION: NOT AT ALL
SUM OF ALL RESPONSES TO PHQ QUESTIONS 1-9: 3
SUM OF ALL RESPONSES TO PHQ QUESTIONS 1-9: 3
2. FEELING DOWN, DEPRESSED OR HOPELESS: NOT AT ALL

## 2024-03-19 ASSESSMENT — ANXIETY QUESTIONNAIRES
6. BECOMING EASILY ANNOYED OR IRRITABLE: NEARLY EVERY DAY
4. TROUBLE RELAXING: NEARLY EVERY DAY
2. NOT BEING ABLE TO STOP OR CONTROL WORRYING: NEARLY EVERY DAY
7. FEELING AFRAID AS IF SOMETHING AWFUL MIGHT HAPPEN: NEARLY EVERY DAY
1. FEELING NERVOUS, ANXIOUS, OR ON EDGE: NEARLY EVERY DAY
GAD7 TOTAL SCORE: 21
3. WORRYING TOO MUCH ABOUT DIFFERENT THINGS: NEARLY EVERY DAY
IF YOU CHECKED OFF ANY PROBLEMS ON THIS QUESTIONNAIRE, HOW DIFFICULT HAVE THESE PROBLEMS MADE IT FOR YOU TO DO YOUR WORK, TAKE CARE OF THINGS AT HOME, OR GET ALONG WITH OTHER PEOPLE: VERY DIFFICULT
5. BEING SO RESTLESS THAT IT IS HARD TO SIT STILL: NEARLY EVERY DAY

## 2024-03-19 ASSESSMENT — LIFESTYLE VARIABLES: HOW OFTEN DO YOU HAVE A DRINK CONTAINING ALCOHOL: NEVER

## 2024-03-19 NOTE — ED TRIAGE NOTES
Pt states he was recently in [place] and was having \"mental health issues\" but qualified for healthcare there until he had to travel to [place] for work and didn't qualify for healthcare there so he wasn't able to get meds he normally needs there, pt states he just got back here and has been having paranoia, insomnia and anxiety attacks since. Denies SI or HI.

## 2024-03-19 NOTE — ED NOTES
Bsmart Progress Note:      Writer made aware of Bsmart consult and will assess Pt once previous consults are complete    Pt resting quietly in room.  NO new requests at this time.

## 2024-03-19 NOTE — ED NOTES
I have reviewed discharge instructions with the patient.  The patient verbalized understanding.    Patient left ED via Discharge Method: ambulatory to Home.    Opportunity for questions and clarification provided.       Patient given 0 scripts.         To continue your aftercare when you leave the hospital, you may receive an automated call from our care team to check in on how you are doing.  This is a free service and part of our promise to provide the best care and service to meet your aftercare needs.” If you have questions, or wish to unsubscribe from this service please call [phone].  Thank you for Choosing our [facility] Emergency Department.

## 2024-03-19 NOTE — ED PROVIDER NOTES
sleep disturbance. Negative for self-injury and suicidal ideas. The patient is nervous/anxious.         Physical Exam     Vitals signs and nursing note reviewed:  Vitals:    03/19/24 0256 03/19/24 0344 03/19/24 0446   BP: (!) 164/85  (!) 145/82   Pulse: (!) 107  88   Resp: 18  16   Temp: 98.3 °F (36.8 °C)     TempSrc: Oral     SpO2: 98%  98%   Weight:  99.8 kg (220 lb)    Height:  1.778 m (5' 10\")       Physical Exam  Vitals and nursing note reviewed.   Constitutional:       General: He is not in acute distress.  HENT:      Head: Normocephalic and atraumatic.      Right Ear: External ear normal.      Left Ear: External ear normal.      Nose: Nose normal.      Mouth/Throat:      Mouth: Mucous membranes are moist.   Eyes:      Extraocular Movements: Extraocular movements intact.      Conjunctiva/sclera: Conjunctivae normal.      Pupils: Pupils are equal, round, and reactive to light.   Cardiovascular:      Rate and Rhythm: Normal rate and regular rhythm.      Heart sounds: No murmur heard.  Pulmonary:      Effort: Pulmonary effort is normal.      Breath sounds: Normal breath sounds.   Abdominal:      General: Bowel sounds are normal.      Palpations: Abdomen is soft.      Tenderness: There is no abdominal tenderness.   Musculoskeletal:         General: Normal range of motion.      Cervical back: Normal range of motion and neck supple.   Skin:     General: Skin is warm and dry.   Neurological:      General: No focal deficit present.      Mental Status: He is alert and oriented to person, place, and time.   Psychiatric:         Mood and Affect: Mood is anxious.         Speech: Speech normal.         Behavior: Behavior normal. Behavior is cooperative.         Thought Content: Thought content is paranoid. Thought content is not delusional. Thought content does not include homicidal or suicidal ideation. Thought content does not include homicidal or suicidal plan.         Cognition and Memory: Cognition and memory normal.

## 2024-03-20 ASSESSMENT — ENCOUNTER SYMPTOMS
SORE THROAT: 0
PHOTOPHOBIA: 0
SHORTNESS OF BREATH: 0
ABDOMINAL DISTENTION: 0
FEELING OF CHOKING: 0
COLOR CHANGE: 0
EYE PAIN: 0
COUGH: 0

## 2024-03-21 NOTE — PROGRESS NOTES
Donnell Cotton  1985 is a 38 y.o. male ,Established patient, here for evaluation of the following chief complaint(s):   Chief Complaint   Patient presents with    Follow-up     Had come off his medicine- has been trying to get into [facility] with no answer has of yet but would like to discuss getting back on medicine with you for the time due to not being able to sleep at night over the past 4 days has maybe had 4-5 hours of sleep-           1. Insomnia due to other mental disorder  -     ALPRAZolam (XANAX) 1 MG tablet; Take 1 tablet by mouth nightly as needed for Sleep for up to 60 days. Max Daily Amount: 1 mg, Disp-30 tablet, R-1Normal  2. Paranoid disorder, atypical (HCC)  -     External Referral to Psychiatry  3. Paranoid schizophrenia (HCC)  -     External Referral to Psychiatry        Return in about 4 weeks (around 4/16/2024).        Subjective   SUBJECTIVE/OBJECTIVE:  He has lately had trouble sleeping and is trying to get in with mental health to get back on his medications. The last several months he has been moving according to the work he has to do from state to state and where one state covers his medications the other state does not.    HE WOULD LIKE TO GET BACK ON INVEGA INJECTION.    Anxiety  Presents for follow-up visit. Symptoms include insomnia, nervous/anxious behavior and restlessness. Patient reports no chest pain, compulsions, depressed mood, feeling of choking, irritability, malaise, shortness of breath or suicidal ideas. Primary symptoms comment: PARANOIA AT TIMES THAT HE IS AWARE OF. Symptoms occur most days. The severity of symptoms is moderate.       Mental Health Problem  The primary symptoms do not include dysphoric mood, delusions, hallucinations, bizarre behavior, disorganized speech, negative symptoms or somatic symptoms. Primary symptoms comment: PARANOIA AT TIMES THAT HE IS AWARE OF. The current episode started more than 1 month ago.   Additional symptoms

## 2024-03-25 ENCOUNTER — TELEPHONE (OUTPATIENT)
Dept: FAMILY MEDICINE CLINIC | Facility: CLINIC | Age: 39
End: 2024-03-25

## 2024-03-25 NOTE — TELEPHONE ENCOUNTER
Anesthesia Pre Eval Note    Anesthesia ROS/Med Hx    Overall Review:  EKG was reviewed     Anesthetic Complication History:  Patient does not have a history of anesthetic complications      Neuro/Psych Review:    Positive for headaches    Cardiovascular Review:  Exercise tolerance: good (>4 METS)  Positive for hypertension  Positive for hyperlipidemia  Additional Results:  EKG:  Encounter Date: 03/27/23  -Electrocardiogram 12-Lead:        Result                      Value                           Ventricular Rate EKG/M*     105                             Atrial Rate (BPM)           106                             OH-Interval (MSEC)          197                             QRS-Interval (MSEC)         73                              QT-Interval (MSEC)          324                             QTc                         429                             P Axis (Degrees)            34                              R Axis (Degrees)            8                               T Axis (Degrees)            64                              REPORT TEXT                                             Sinus tachycardia   Anteroseptal infarct, old       ALLERGIES:  No Known Allergies       Last Labs        Component                Value               Date/Time                  WBC                      5.2                 03/28/2023 0329            RBC                      4.82                03/28/2023 0329            HGB                      14.1                03/28/2023 0329            HCT                      41.5                03/28/2023 0329            MCV                      86.1                03/28/2023 0329            MCH                      29.3                03/28/2023 0329            MCHC                     34.0                03/28/2023 0329            RDW-CV                   12.2                03/28/2023 0329            Sodium                   138                 03/28/2023 0819            Potassium                 IT MAY TAKE A WHILE TO GET IN HIS SYSTEM----may take melatonin with this---IF NOT BETTER---GET APPT WITHIN THE WEEK   4.2                 03/28/2023 0329            Chloride                 103                 03/28/2023 0329            Carbon Dioxide           27                  03/28/2023 0329            Glucose                  268 (H)             03/28/2023 0329            BUN                      14                  03/28/2023 0329            Creatinine               0.59 (L)            03/28/2023 0329            Glomerular Filtrati*     >90                 03/28/2023 0329            Calcium                  8.8                 03/28/2023 0329            PLT                      200                 03/28/2023 0329            PTT                      26                  03/28/2023 0329            INR                      0.9                 03/28/2023 0329        Past Medical History:  No date: Essential (primary) hypertension  No date: High cholesterol    History reviewed. No pertinent surgical history.       Prior to Admission medications :  Medication cyclobenzaprine (FLEXERIL) 10 MG tablet, Sig Take 10 mg by mouth 3 times daily as needed for Muscle spasms., Start Date , End Date , Taking? Yes, Authorizing Provider Outside Provider    Medication empagliflozin (JARDIANCE) 25 MG tablet, Sig Take 25 mg by mouth daily (before breakfast)., Start Date , End Date , Taking? Yes, Authorizing Provider Outside Provider    Medication naproxen (NAPROSYN) 500 MG tablet, Sig Take 500 mg by mouth 2 times daily as needed for Pain., Start Date , End Date , Taking? Yes, Authorizing Provider Outside Provider    Medication Semaglutide (OZEMPIC, 1 MG/DOSE, SC), Sig Inject 1 mg into the skin 1 day a week. On Sundays, Start Date , End Date , Taking? Yes, Authorizing Provider Outside Provider    Medication metformin (GLUCOPHAGE) 1000 MG tablet, Sig Take 1,000 mg by mouth in the morning and 1,000 mg in the evening. Take with meals., Start Date , End Date , Taking? Yes, Authorizing Provider Outside Provider         Patient Vitals in the past 24  hrs:  03/28/23 1300, BP:134/78, Pulse:84, Resp:17, SpO2:94 %  03/28/23 1200, BP:112/66, Temp:36.1 °C (97 °F), Temp src:Temporal, Pulse:82, Resp:18, SpO2:94 %  03/28/23 1100, BP:93/67, Pulse:83, Resp:16, SpO2:95 %  03/28/23 1000, BP:120/75, Pulse:90, Resp:16, SpO2:94 %  03/28/23 0900, BP:128/76, Pulse:92, Resp:19, SpO2:94 %  03/28/23 0800, BP:124/62, Temp:36.2 °C (97.2 °F), Temp src:Temporal, Pulse:92, Resp:14, SpO2:95 %  03/28/23 0700, BP:117/59, Pulse:94, Resp:19, SpO2:96 %  03/28/23 0600, BP:131/76, Pulse:95, Resp:18, SpO2:94 %  03/28/23 0500, BP:126/72, Pulse:95, Resp:16, SpO2:97 %  03/28/23 0400, BP:114/45, Temp:36.4 °C (97.5 °F), Temp src:Axillary, Pulse:97, Resp:19, SpO2:96 %  03/28/23 0347, Resp:15  03/28/23 0300, BP:123/64, Pulse:(!) 101, Resp:18, SpO2:90 %  03/28/23 0246, Resp:16  03/28/23 0208, Height:5' 8\" (1.727 m), Weight:109 kg (240 lb 4.8 oz)  03/28/23 0200, Pulse:(!) 104, Resp:12, SpO2:95 %  03/28/23 0155, BP:136/69, Pulse:(!) 107, Resp:16, SpO2:97 %  03/28/23 0132, BP:(!) 141/77, Pulse:(!) 114, Resp:(!) 11, SpO2:98 %  03/28/23 0116, BP:(!) 140/71, Pulse:(!) 109, Resp:(!) 11, SpO2:94 %  03/28/23 0113, SpO2:98 %  03/28/23 0101, BP:137/79, Pulse:(!) 110, Resp:15, SpO2:94 %  03/28/23 0046, BP:133/75, Pulse:(!) 109, Resp:(!) 22, SpO2:94 %, Weight:109.6 kg (241 lb 10 oz)  03/28/23 0033, BP:133/80, Pulse:(!) 113, Resp:19, SpO2:96 %  03/28/23 0028, BP:(!) 144/78, Pulse:(!) 113, Resp:(!) 23, SpO2:96 %  03/28/23 0013, BP:(!) 152/83, Pulse:(!) 123, Resp:20, SpO2:97 %  03/28/23 0002, BP:(!) 152/81  03/27/23 2358, BP:(!) 152/81, Pulse:(!) 109, Resp:16, SpO2:98 %  03/27/23 2343, BP:(!) 158/86, Pulse:(!) 105, Resp:16, SpO2:98 %  03/27/23 2335, BP:(!) 161/85, Pulse:(!) 108, Resp:(!) 24, SpO2:98 %  03/27/23 2330, SpO2:99 %  03/27/23 2328, Pulse:(!) 105, Resp:(!) 23  03/27/23 2325, BP:(!) 157/69  03/27/23 2315, Weight:109 kg (240 lb 4.8 oz)  03/27/23 2309, Temp:36 °C (96.8 °F)      Relevant Problems   No relevant  active problems       Physical Exam     Airway   Mallampati: II  TM Distance: >3 FB  Neck ROM: Full  Neck: Non-tender and Able to place in sniff position  TMJ Mobility: Good    Cardiovascular    Cardio Rhythm: Regular  Cardio Rate: Normal    Head Assessment  Head assessment: Normocephalic and Atraumatic    General Assessment  General Assessment: Alert and oriented and No acute distress    Dental Exam    Patient has:  Upper dentures, Poor Dentition and Significant Periodontal Disease  Dental Note: edentuluos upper teeth; denies loose teeth;     Pulmonary Exam    Breath sounds clear to auscultation:  Yes  Patient Demonstrates:  Non-labored Breathing    Abdominal Exam    Patient Demonstrates:  Obese      Anesthesia Plan:    ASA Status: 3  Anesthesia Type: General    Preferred Airway Type: ETTPatient does not have a difficult airway or is not at risk of aspiration.   Premedication: IV      Post-op Pain Management: Per Surgeon      Checklist  Reviewed: Lab Results, EKG, Chest X-Rays, NPO Status, Allergies, Medications, Problem list, Past Med History and Patient Summary  Monitors  Discussed risks of the following Invasive monitors with patient: Arterial Line    Consent/Risks Discussed Statement:  The proposed anesthetic plan, including its risks and benefits, have been discussed with the Patient along with the risks and benefits of alternatives. Questions were encouraged and answered and the patient and/or representative understands and agrees to proceed.    I have discussed elements of the patient's history or examination, as noted above and/or as follows, that place the patient at higher risk of complications; BMI> 30 (obesity) and neurological disease.    I discussed with the patient (and/or patient's legal representative) the risks and benefits of the proposed anesthesia plan, General, which may include services performed by other anesthesia providers.    Alternative anesthesia plans, if available, were reviewed with  the patient (and/or patient's legal representative). Discussion has been held with the patient (and/or patient's legal representative) regarding risks of anesthesia, which include eye injury, headache, vomiting, sore throat, post-op intubation, oral injury, nausea and dental injury and emergent situations that may require change in anesthesia plan.    The patient (and/or patient's legal representative) has indicated understanding, his/her questions have been answered, and he/she wishes to proceed with the planned anesthetic.      Informed Consent for Blood: Consented  Blood Products: Not Anticipated

## 2024-03-27 ENCOUNTER — HOSPITAL ENCOUNTER (EMERGENCY)
Age: 39
Discharge: HOME | End: 2024-03-27
Attending: EMERGENCY MEDICINE
Payer: MEDICAID

## 2024-03-27 VITALS
OXYGEN SATURATION: 96 % | SYSTOLIC BLOOD PRESSURE: 139 MMHG | RESPIRATION RATE: 20 BRPM | HEART RATE: 94 BPM | WEIGHT: 255 LBS | DIASTOLIC BLOOD PRESSURE: 88 MMHG | HEIGHT: 74 IN | TEMPERATURE: 98 F | BODY MASS INDEX: 32.73 KG/M2

## 2024-03-27 DIAGNOSIS — F20.9 SCHIZOPHRENIA, UNSPECIFIED TYPE (HCC): ICD-10-CM

## 2024-03-27 DIAGNOSIS — G47.00 INSOMNIA, UNSPECIFIED TYPE: Primary | ICD-10-CM

## 2024-03-27 PROCEDURE — 99283 EMERGENCY DEPT VISIT LOW MDM: CPT

## 2024-03-27 RX ORDER — OLANZAPINE 5 MG/1
5 TABLET ORAL 2 TIMES DAILY
Qty: 60 TABLET | Refills: 0 | Status: SHIPPED | OUTPATIENT
Start: 2024-03-27 | End: 2024-04-26

## 2024-03-27 ASSESSMENT — LIFESTYLE VARIABLES
HOW OFTEN DO YOU HAVE A DRINK CONTAINING ALCOHOL: 2-3 TIMES A WEEK
HOW MANY STANDARD DRINKS CONTAINING ALCOHOL DO YOU HAVE ON A TYPICAL DAY: 1 OR 2

## 2024-03-27 ASSESSMENT — PAIN - FUNCTIONAL ASSESSMENT: PAIN_FUNCTIONAL_ASSESSMENT: NONE - DENIES PAIN

## 2024-03-27 ASSESSMENT — ENCOUNTER SYMPTOMS: SHORTNESS OF BREATH: 0

## 2024-03-27 NOTE — ED TRIAGE NOTES
Pt brought in by EMS.  C/o not sleeping for the past 4 days.  Having auditory and visual hallucinations.  Pt also having experiencing anxiety.  Per EMS, pt  was taking zanax and invega.

## 2024-03-27 NOTE — DISCHARGE INSTRUCTIONS
Zyprexa twice daily for schizophrenia and insomnia at night.  Over-the-counter Benadryl 25 to 50 mg by mouth nightly for insomnia.  Avoid all caffeine after noon.  No TV, computer screens or phone screens in the 2 hours before bedtime and then the lights and take a warm shower or bath and do some light reading prior to bed.  Try to create a routine in the evening that helps your body prepare for sleep.  Follow-up with [facility] and your primary care doctor within the next week especially if not improving.  Return if any new, worsening or concerning symptoms

## 2024-03-27 NOTE — ED PROVIDER NOTES
Vitals signs and nursing note reviewed:  Vitals:    03/27/24 0318   BP: 139/88   Pulse: 94   Resp: 20   Temp: 98 °F (36.7 °C)   TempSrc: Oral   SpO2: 96%   Weight: 115.7 kg (255 lb)   Height: 1.88 m (6' 2\")      Physical Exam  Vitals and nursing note reviewed.   Constitutional:       General: He is not in acute distress.     Appearance: Normal appearance. He is not ill-appearing.   HENT:      Head: Normocephalic and atraumatic.      Nose: Nose normal.      Mouth/Throat:      Mouth: Mucous membranes are moist.   Eyes:      Conjunctiva/sclera: Conjunctivae normal.      Pupils: Pupils are equal, round, and reactive to light.   Cardiovascular:      Rate and Rhythm: Normal rate and regular rhythm.      Pulses: Normal pulses.      Heart sounds: Normal heart sounds.   Pulmonary:      Effort: Pulmonary effort is normal.      Breath sounds: Normal breath sounds.   Abdominal:      General: There is no distension.      Palpations: Abdomen is soft.      Tenderness: There is no abdominal tenderness. There is no guarding or rebound.   Musculoskeletal:         General: Normal range of motion.   Skin:     General: Skin is warm and dry.   Neurological:      Mental Status: He is alert and oriented to person, place, and time.      Sensory: No sensory deficit.      Motor: No weakness.   Psychiatric:         Attention and Perception: Attention normal.         Mood and Affect: Mood normal.         Speech: Speech normal.         Behavior: Behavior is slowed. Behavior is not agitated, aggressive, withdrawn, hyperactive or combative. Behavior is cooperative.         Thought Content: Thought content is not paranoid. Thought content does not include homicidal or suicidal ideation.        Procedures     Procedures    No orders of the defined types were placed in this encounter.       Medications given during this emergency department visit:  Medications - No data to display    Current Discharge Medication List           Past Medical

## 2024-03-27 NOTE — ED NOTES
Patient mobility status  with no difficulty. Provider aware     I have reviewed discharge instructions with the patient.  The patient verbalized understanding.    Patient left ED via Discharge Method: ambulatory to Home with  self .    Opportunity for questions and clarification provided.     Patient given 4 scripts.

## 2024-03-28 ENCOUNTER — HOSPITAL ENCOUNTER (EMERGENCY)
Age: 39
Discharge: HOME | End: 2024-03-28
Attending: EMERGENCY MEDICINE
Payer: MEDICAID

## 2024-03-28 VITALS
TEMPERATURE: 98 F | HEART RATE: 87 BPM | BODY MASS INDEX: 32.73 KG/M2 | DIASTOLIC BLOOD PRESSURE: 94 MMHG | OXYGEN SATURATION: 100 % | HEIGHT: 74 IN | SYSTOLIC BLOOD PRESSURE: 146 MMHG | RESPIRATION RATE: 17 BRPM | WEIGHT: 255 LBS

## 2024-03-28 DIAGNOSIS — G47.09 OTHER INSOMNIA: Primary | ICD-10-CM

## 2024-03-28 PROCEDURE — 6370000000 HC RX 637 (ALT 250 FOR IP): Performed by: EMERGENCY MEDICINE

## 2024-03-28 PROCEDURE — 99283 EMERGENCY DEPT VISIT LOW MDM: CPT

## 2024-03-28 RX ORDER — ALPRAZOLAM 0.5 MG/1
0.5 TABLET ORAL ONCE
Status: COMPLETED | OUTPATIENT
Start: 2024-03-28 | End: 2024-03-28

## 2024-03-28 RX ADMIN — ALPRAZOLAM 0.5 MG: 0.5 TABLET ORAL at 02:09

## 2024-03-28 ASSESSMENT — ENCOUNTER SYMPTOMS: ABDOMINAL PAIN: 0

## 2024-03-28 NOTE — ED TRIAGE NOTES
Pt has hx of schizo and bipolar, states he travels a lot.  Pt wants to be evaluated and sent to [facility].  Pt is on oral Invega, states he is manic at this time.  Pt denies SI/HI.   Pt has an appointment with [facility] at 0900 tomorrow

## 2024-03-28 NOTE — ED NOTES
Patient mobility status  with no difficulty. Provider aware     I have reviewed discharge instructions with the patient.  The patient verbalized understanding.    Patient left ED via Discharge Method: ambulatory to Home with  [facility] .    Opportunity for questions and clarification provided.     Patient given 0 scripts.            Luz Snyder RN  03/28/24 7221

## 2024-03-28 NOTE — ED PROVIDER NOTES
Emergency Department Provider Note       PCP: Hong Chino MD   Age: 38 y.o.   Sex: male     DISPOSITION Decision To Discharge 03/28/2024 01:53:29 AM       ICD-10-CM    1. Other insomnia  G47.09           Medical Decision Making     Patient is a 38-year-old male with history of schizophrenia bipolar who states he cannot sleep.  Patient was seen yesterday at [facility] and given Xanax in the emergency department discharged home with a prescription for Zyprexa which she has not filled as of yet.  Patient has an appointment with [facility] today at 9 AM.  Patient denies any suicidal or homicidal ideations.  Patient's main issue today is insomnia.      Mental Health Problem  Presenting symptoms comment:  Insomnia  Degree of incapacity (severity):  Mild  Onset quality:  Gradual  Duration:  1 week  Timing:  Intermittent  Progression:  Waxing and waning  Chronicity:  Chronic  Relieved by:  Nothing  Worsened by:  Nothing  Associated symptoms: no abdominal pain, no feelings of worthlessness and no headaches        Differential diagnosis includes was not limited to schizophrenia, bipolar    Patient's physical exam is unremarkable patient is ANO x 4.  Patient has a nonfocal exam and denies any suicidal homicidal ideation on exam.  We will DC home and have patient follow-up with [facility] in the morning as scheduled at 9 AM.  Patient was given Xanax 0.5 mg p.o. here.  All questions answered.     1 or more acute illnesses that pose a threat to life or bodily function.   Shared medical decision making was utilized in creating the patients health plan today.    I independently ordered and reviewed each unique test.  I reviewed external records: provider visit note from PCP.                   History     Patient is a 38-year-old male with history of schizophrenia bipolar who states he cannot sleep.  Patient was seen yesterday at [facility] and given Xanax in the emergency department discharged home

## 2024-04-01 ENCOUNTER — TELEPHONE (OUTPATIENT)
Dept: ORTHOPEDIC SURGERY | Age: 39
End: 2024-04-01

## 2024-04-15 ENCOUNTER — TELEPHONE (OUTPATIENT)
Dept: FAMILY MEDICINE CLINIC | Facility: CLINIC | Age: 39
End: 2024-04-15

## 2024-04-15 DIAGNOSIS — F51.05 INSOMNIA DUE TO OTHER MENTAL DISORDER: ICD-10-CM

## 2024-04-15 DIAGNOSIS — F99 INSOMNIA DUE TO OTHER MENTAL DISORDER: ICD-10-CM

## 2024-04-15 DIAGNOSIS — F41.9 ANXIETY: Primary | ICD-10-CM

## 2024-04-15 NOTE — TELEPHONE ENCOUNTER
----- Message from Odalis Jordan sent at 4/15/2024  3:12 PM EDT -----  Subject: Refill Request    QUESTIONS  Name of Medication? ALPRAZolam (XANAX) 1 MG tablet  Patient-reported dosage and instructions? 1mg  How many days do you have left? 5  Preferred Pharmacy? [id number]  Pharmacy phone number (if available)? [phone]  ---------------------------------------------------------------------------  --------------  CALL BACK INFO  What is the best way for the office to contact you? OK to leave message on   voicemail  Preferred Call Back Phone Number? [phone]  ---------------------------------------------------------------------------  --------------  SCRIPT ANSWERS  Relationship to Patient? Self

## 2024-04-16 RX ORDER — ALPRAZOLAM 1 MG/1
1 TABLET ORAL NIGHTLY PRN
Qty: 30 TABLET | Refills: 0 | Status: SHIPPED | OUTPATIENT
Start: 2024-04-19 | End: 2024-06-18

## 2024-04-16 NOTE — TELEPHONE ENCOUNTER
I sent the requested medication/product in. Please let patient know to check with their pharmacy  Thanks  Amira BALDWIN

## 2024-05-22 ENCOUNTER — OFFICE VISIT (OUTPATIENT)
Dept: FAMILY MEDICINE CLINIC | Facility: CLINIC | Age: 39
End: 2024-05-22

## 2024-05-22 VITALS
OXYGEN SATURATION: 97 % | SYSTOLIC BLOOD PRESSURE: 138 MMHG | WEIGHT: 266.8 LBS | DIASTOLIC BLOOD PRESSURE: 88 MMHG | HEIGHT: 74 IN | BODY MASS INDEX: 34.24 KG/M2 | TEMPERATURE: 98.3 F | HEART RATE: 120 BPM

## 2024-05-22 DIAGNOSIS — F41.9 ANXIETY: ICD-10-CM

## 2024-05-22 DIAGNOSIS — R00.0 TACHYCARDIA: Primary | ICD-10-CM

## 2024-05-22 DIAGNOSIS — E78.49 OTHER HYPERLIPIDEMIA: ICD-10-CM

## 2024-05-22 DIAGNOSIS — F99 INSOMNIA DUE TO OTHER MENTAL DISORDER: ICD-10-CM

## 2024-05-22 DIAGNOSIS — F51.05 INSOMNIA DUE TO OTHER MENTAL DISORDER: ICD-10-CM

## 2024-05-22 PROCEDURE — 99213 OFFICE O/P EST LOW 20 MIN: CPT | Performed by: FAMILY MEDICINE

## 2024-05-22 PROCEDURE — 93000 ELECTROCARDIOGRAM COMPLETE: CPT | Performed by: FAMILY MEDICINE

## 2024-05-22 RX ORDER — ALPRAZOLAM 1 MG/1
TABLET ORAL
Qty: 45 TABLET | Refills: 2 | Status: SHIPPED | OUTPATIENT
Start: 2024-05-22 | End: 2024-11-22

## 2024-05-25 ASSESSMENT — ENCOUNTER SYMPTOMS
EYE PAIN: 0
PHOTOPHOBIA: 0
BLOOD IN STOOL: 0
SORE THROAT: 0
SHORTNESS OF BREATH: 0
ABDOMINAL DISTENTION: 0
COLOR CHANGE: 0
ABDOMINAL PAIN: 0
NAUSEA: 0
COUGH: 0

## 2024-05-25 NOTE — PROGRESS NOTES
[facility].    He has been to [facility] and is off zyprexa and now on depakote.      Medication Refill  This is a chronic problem. The current episode started more than 1 year ago. The problem occurs daily. The problem has been unchanged. Pertinent negatives include no abdominal pain, chest pain, chills, coughing, fatigue, fever, headaches, joint swelling, myalgias, nausea, rash, sore throat or weakness.   Anxiety  Presents for follow-up visit. Patient reports no chest pain, compulsions, confusion, decreased concentration, nausea, nervous/anxious behavior, palpitations, shortness of breath or suicidal ideas.           Review of Systems   Constitutional:  Negative for chills, fatigue and fever.   HENT:  Negative for ear pain, hearing loss and sore throat.    Eyes:  Negative for photophobia and pain.   Respiratory:  Negative for cough and shortness of breath.    Cardiovascular:  Negative for chest pain, palpitations and leg swelling.   Gastrointestinal:  Negative for abdominal distention, abdominal pain, blood in stool and nausea.   Genitourinary:  Negative for dysuria and urgency.   Musculoskeletal:  Negative for joint swelling and myalgias.   Skin:  Negative for color change, pallor and rash.   Neurological:  Negative for speech difficulty, weakness, light-headedness and headaches.   Hematological:  Negative for adenopathy.   Psychiatric/Behavioral:  Negative for agitation, confusion, decreased concentration, hallucinations, self-injury and suicidal ideas. The patient is not nervous/anxious.           Objective   Physical Exam  Constitutional:       Appearance: Normal appearance.   HENT:      Head: Normocephalic and atraumatic.      Nose: Nose normal.   Eyes:      Extraocular Movements: Extraocular movements intact.      Conjunctiva/sclera: Conjunctivae normal.      Pupils: Pupils are equal, round, and reactive to light.   Cardiovascular:      Rate and Rhythm: Normal rate and regular rhythm.      Pulses:

## 2024-08-16 ENCOUNTER — TELEPHONE (OUTPATIENT)
Dept: FAMILY MEDICINE CLINIC | Facility: CLINIC | Age: 39
End: 2024-08-16

## 2024-08-16 DIAGNOSIS — F51.05 INSOMNIA DUE TO OTHER MENTAL DISORDER: ICD-10-CM

## 2024-08-16 DIAGNOSIS — F99 INSOMNIA DUE TO OTHER MENTAL DISORDER: ICD-10-CM

## 2024-08-16 DIAGNOSIS — F41.9 ANXIETY: ICD-10-CM

## 2024-08-16 RX ORDER — ALPRAZOLAM 1 MG/1
TABLET ORAL
Qty: 45 TABLET | Refills: 2 | Status: SHIPPED | OUTPATIENT
Start: 2024-08-16 | End: 2025-02-16

## 2024-08-16 NOTE — TELEPHONE ENCOUNTER
Pt is calling needing his     Alprazolam 1mg       [facility] pharmacy on file.       Thank you

## 2024-08-19 ENCOUNTER — TELEPHONE (OUTPATIENT)
Dept: FAMILY MEDICINE CLINIC | Facility: CLINIC | Age: 39
End: 2024-08-19
